# Patient Record
Sex: FEMALE | Race: OTHER | Employment: STUDENT | URBAN - METROPOLITAN AREA
[De-identification: names, ages, dates, MRNs, and addresses within clinical notes are randomized per-mention and may not be internally consistent; named-entity substitution may affect disease eponyms.]

---
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
## 2025-01-03 ENCOUNTER — TELEPHONE (OUTPATIENT)
Dept: FAMILY MEDICINE CLINIC | Facility: CLINIC | Age: 20
End: 2025-01-03

## 2025-01-03 ENCOUNTER — TELEPHONE (OUTPATIENT)
Dept: OBGYN CLINIC | Facility: CLINIC | Age: 20
End: 2025-01-03

## 2025-01-03 DIAGNOSIS — Z34.90 PREGNANCY, UNSPECIFIED GESTATIONAL AGE (HCC): Primary | ICD-10-CM

## 2025-01-03 DIAGNOSIS — N91.2 AMENORRHEA: Primary | ICD-10-CM

## 2025-01-03 NOTE — TELEPHONE ENCOUNTER
Patient notified and verbalized understanding.   Number to [facility] provided     Aware to stop OCP and dicyclomine. Will start prenatal vitamin

## 2025-01-03 NOTE — TELEPHONE ENCOUNTER
Patient calling to initiate prenatal care  LMP ?? PATIENT STATE SHE THINKS ITS BEEN ABOUT 3 MONTHS BUT UNSURE DUE TO COLITIS. OTC TEST TAKEN ON 12/29 WHICH WAS POSITIVE  Patient is 7-8 weeks on ??  Confirmation of pregnancy appointment scheduled on 1/17/25  [facility]    Any history of ectopic pregnancy? NO  Any history of miscarriage? NO  Any medications that you are taking on a regular basis other than prenatal vitamins? NO (if not taking prenatal vitamins, encourage patient to start taking.)  Any bleeding since the first day of last LMP and your positive pregnancy test? NO  Future Appointments   Date Time Provider Department Center   1/17/2025 12:30 PM [facility] [address]   1/17/2025  2:45 PM Goyo Castro MD [facility] [address]   2/19/2025  2:00 PM Lindsey Miller MD [facility] [place]   3/12/2025  8:30 AM [facility]

## 2025-01-03 NOTE — TELEPHONE ENCOUNTER
[employer] activation code sent.  Prenatal information can be sent once [employer] is active.

## 2025-01-03 NOTE — TELEPHONE ENCOUNTER
It may. I still recommend seeing GI as scheduled.     Mean time, get in to see OB (refer to [facility]).   Stop birth control if taking it at all.   Stop dicyclomine.     Start taking prenatal vitamin daily.

## 2025-01-03 NOTE — TELEPHONE ENCOUNTER
PATIENT IS CALLING.  PATIENT SAYS THAT DR FERGUSON HAD DISCUSSED PATIENT HAVING COLITIS.  PATIENT SAYS SHE IS NOW PREGNANT.  ASKING IF THIS WILL AFFECT THE BABY.

## 2025-01-28 ENCOUNTER — OFFICE VISIT (OUTPATIENT)
Dept: FAMILY MEDICINE CLINIC | Facility: CLINIC | Age: 20
End: 2025-01-28
Payer: MEDICAID

## 2025-01-28 VITALS
RESPIRATION RATE: 20 BRPM | SYSTOLIC BLOOD PRESSURE: 102 MMHG | HEART RATE: 87 BPM | WEIGHT: 105.88 LBS | TEMPERATURE: 97 F | DIASTOLIC BLOOD PRESSURE: 70 MMHG | BODY MASS INDEX: 21 KG/M2 | OXYGEN SATURATION: 100 %

## 2025-01-28 DIAGNOSIS — Z23 NEED FOR VACCINATION: ICD-10-CM

## 2025-01-28 DIAGNOSIS — K51.919 ULCERATIVE COLITIS WITH COMPLICATION, UNSPECIFIED LOCATION (HCC): Primary | ICD-10-CM

## 2025-01-28 DIAGNOSIS — Z34.90 PREGNANCY, UNSPECIFIED GESTATIONAL AGE (HCC): ICD-10-CM

## 2025-01-28 PROBLEM — K51.90 ULCERATIVE COLITIS (HCC): Status: ACTIVE | Noted: 2024-12-02

## 2025-01-28 PROCEDURE — 90656 IIV3 VACC NO PRSV 0.5 ML IM: CPT | Performed by: FAMILY MEDICINE

## 2025-01-28 PROCEDURE — 99214 OFFICE O/P EST MOD 30 MIN: CPT | Performed by: FAMILY MEDICINE

## 2025-01-28 PROCEDURE — 90471 IMMUNIZATION ADMIN: CPT | Performed by: FAMILY MEDICINE

## 2025-01-28 RX ORDER — FERROUS SULFATE 325(65) MG
325 TABLET ORAL DAILY
COMMUNITY

## 2025-01-28 RX ORDER — FAMOTIDINE 20 MG/1
20 TABLET, FILM COATED ORAL 2 TIMES DAILY
COMMUNITY

## 2025-01-28 RX ORDER — CHOLECALCIFEROL (VITAMIN D3) 25 MCG
1 TABLET,CHEWABLE ORAL DAILY
COMMUNITY

## 2025-01-28 RX ORDER — PREDNISONE 10 MG/1
10 TABLET ORAL DAILY
COMMUNITY

## 2025-01-28 NOTE — PROGRESS NOTES
Leah Craig is a 19 year old female.  Chief Complaint   Patient presents with    Follow - Up     Hospital follow up for 1 week        HPI:   Pregnant 10 w 4 d. Not too sick recently, but was a lot worse when in the hospital. She and her boyfriend are excited about the baby.     Has had blood in stool for months, pain got worse. Went to ER at [facility]. Admitted 1/15/25-1/21/25. Has been home a week. Living with grandmother and aunt. Her boyfriend (her child's father) lives there as well.   Had a UTI while in hospital as well. That is better.   BM's are better, solid. Less frequent, down to 4 x day.   Hb was down to 7.2. she is taking iron daily.     Eating more, appetite is coming back. She had lost about 10 lbs while she was sick.     ALLERGIES:  Allergies[1]      Current Outpatient Medications   Medication Sig Dispense Refill    prenatal vitamin with DHA 27-0.8-228 MG Oral Cap Take 1 capsule by mouth daily.      predniSONE 10 MG Oral Tab Take 1 tablet (10 mg total) by mouth daily.      Mesalamine  MG Oral Cap CR Take 1 capsule (250 mg total) by mouth 4 (four) times daily.      famotidine 20 MG Oral Tab Take 1 tablet (20 mg total) by mouth 2 (two) times daily.      dicyclomine 20 MG Oral Tab Take 1 tablet (20 mg total) by mouth 4 (four) times daily before meals and nightly. (Patient not taking: Reported on 1/28/2025) 20 tablet 0    ondansetron 4 MG Oral Tablet Dispersible Take 1 tablet (4 mg total) by mouth every 8 (eight) hours as needed. (Patient not taking: Reported on 1/28/2025) 20 tablet 0    Norgestim-Eth Estrad Triphasic (ORTHO TRI-CYCLEN LO) 0.18/0.215/0.25 MG-25 MCG Oral Tab Take 1 tablet by mouth daily. (Patient not taking: Reported on 1/28/2025) 84 tablet 3      No past medical history on file.   Social History:  Social History     Socioeconomic History    Marital status: Single   Tobacco Use    Smoking status: Never    Smokeless tobacco: Never   Vaping Use    Vaping status: Never Used   Substance  and Sexual Activity    Alcohol use: No    Drug use: Yes     Types: Cannabis     Comment: occ     Social Drivers of Health     Food Insecurity: No Food Insecurity (1/28/2025)    NCSS - Food Insecurity     Worried About Running Out of Food in the Last Year: No     Ran Out of Food in the Last Year: No   Transportation Needs: No Transportation Needs (1/28/2025)    NCSS - Transportation     Lack of Transportation: No   Housing Stability: Not At Risk (1/28/2025)    NCSS - Housing/Utilities     Has Housing: Yes     Worried About Losing Housing: No     Unable to Get Utilities: No        BP Readings from Last 6 Encounters:   01/28/25 102/70   12/03/24 98/62   11/11/24 110/68   12/21/23 102/60   10/12/23 96/54   09/18/23 105/55       Wt Readings from Last 6 Encounters:   01/28/25 105 lb 14.4 oz (48 kg) (10%, Z= -1.30)*   12/03/24 116 lb (52.6 kg) (28%, Z= -0.59)*   11/11/24 119 lb (54 kg) (34%, Z= -0.40)*   12/21/23 117 lb 4 oz (53.2 kg) (35%, Z= -0.39)*   10/12/23 115 lb 4 oz (52.3 kg) (31%, Z= -0.49)*   09/18/23 111 lb 6.4 oz (50.5 kg) (23%, Z= -0.73)*     * Growth percentiles are based on CDC (Girls, 2-20 Years) data.       REVIEW OF SYSTEMS:   GENERAL HEALTH: feels well no complaints  SKIN: denies any unusual skin lesions or rashes  RESPIRATORY: denies shortness of breath   CARDIOVASCULAR: denies chest pain on exertion  GI: denies abdominal pain and denies heartburn  NEURO: denies headaches    EXAM:   /70   Pulse 87   Temp 97.3 °F (36.3 °C) (Temporal)   Resp 20   Wt 105 lb 14.4 oz (48 kg)   LMP  (LMP Unknown)   SpO2 100%   BMI 21.39 kg/m²  Body mass index is 21.39 kg/m².      GENERAL: well developed, well nourished,in no apparent distress  SKIN: no rashes,no suspicious lesions  HEENT: atraumatic, normocephalic,ears and throat are clear  NECK: supple,no adenopathy,   LUNGS: clear to auscultation, no rales or wheezing   CARDIO: RRR without murmur  GI: good BS's, no tenderness or guarding. +pelvic  fullness/gravid uterus.   EXTREMITIES: no cyanosis, clubbing or edema    ASSESSMENT AND PLAN:     Encounter Diagnoses   Name Primary?    Ulcerative colitis with complication, unspecified location (HCC) Yes    Pregnancy, unspecified gestational age (HCC)     Need for vaccination        Diagnoses and all orders for this visit:    Ulcerative colitis with complication, unspecified location (HCC)    Pregnancy, unspecified gestational age (HCC)    Need for vaccination  -     Fluzone trivalent vaccine, PF 0.5mL, 6mo+ ([id number])    Follow up with GI, she is trying to get in sooner than March.   Follow up with OB as scheduled, in about 2 weeks.     Orders Placed This Encounter   Procedures    Fluzone trivalent vaccine, PF 0.5mL, 6mo+ ([id number])               Meds & Refills for this Visit:  Requested Prescriptions      No prescriptions requested or ordered in this encounter             The patient indicates understanding of these issues and agrees to the plan.               [1] No Known Allergies

## 2025-02-07 ENCOUNTER — TELEPHONE (OUTPATIENT)
Dept: FAMILY MEDICINE CLINIC | Facility: CLINIC | Age: 20
End: 2025-02-07

## 2025-02-07 NOTE — TELEPHONE ENCOUNTER
Patient states she has cough and runny nose  Started 2 days ago  No fever    Patient can come in if needed but would like message sent first.    Routed to Dr Headley to advise

## 2025-02-07 NOTE — TELEPHONE ENCOUNTER
I would just monitor first. I do not think she needs to be seen at this time.   She can take tylenol as needed for pain.   She can do a nasal spray as needed for congestion, but I would avoid other cold and flu medications with pregnancy.

## 2025-02-07 NOTE — TELEPHONE ENCOUNTER
Patient notified and verbalized understanding.   Will call or go to UC if any new or worsening symptoms.

## 2025-02-07 NOTE — TELEPHONE ENCOUNTER
Patient calling to update that she is sick with a cough. Calling to update PCP. Recently diagnosed with UC and early in pregnancy. GONZALO

## 2025-02-11 ENCOUNTER — TELEPHONE (OUTPATIENT)
Dept: FAMILY MEDICINE CLINIC | Facility: CLINIC | Age: 20
End: 2025-02-11

## 2025-02-11 DIAGNOSIS — Z34.90 PREGNANCY, UNSPECIFIED GESTATIONAL AGE (HCC): Primary | ICD-10-CM

## 2025-02-11 RX ORDER — CHOLECALCIFEROL (VITAMIN D3) 25 MCG
1 TABLET,CHEWABLE ORAL DAILY
Qty: 90 CAPSULE | Refills: 3 | Status: SHIPPED | OUTPATIENT
Start: 2025-02-11

## 2025-02-11 NOTE — TELEPHONE ENCOUNTER
PT CALLED AND WOULD LIKE TO SEE IF DR CAN CALL IN PRENATAL VITAMINS    PLEASE ADV    THANK YOU    [facility] [place]  [place]

## 2025-02-17 ENCOUNTER — OFFICE VISIT (OUTPATIENT)
Dept: OBGYN CLINIC | Facility: CLINIC | Age: 20
End: 2025-02-17
Payer: MEDICAID

## 2025-02-17 ENCOUNTER — ULTRASOUND ENCOUNTER (OUTPATIENT)
Dept: OBGYN CLINIC | Facility: CLINIC | Age: 20
End: 2025-02-17
Payer: MEDICAID

## 2025-02-17 VITALS
DIASTOLIC BLOOD PRESSURE: 60 MMHG | SYSTOLIC BLOOD PRESSURE: 104 MMHG | WEIGHT: 116.25 LBS | BODY MASS INDEX: 23.44 KG/M2 | HEIGHT: 59 IN | HEART RATE: 86 BPM

## 2025-02-17 DIAGNOSIS — Z34.01 ENCOUNTER FOR SUPERVISION OF NORMAL FIRST PREGNANCY IN FIRST TRIMESTER (HCC): ICD-10-CM

## 2025-02-17 DIAGNOSIS — K51.011 ULCERATIVE PANCOLITIS WITH RECTAL BLEEDING (HCC): Primary | ICD-10-CM

## 2025-02-17 DIAGNOSIS — N91.2 AMENORRHEA: ICD-10-CM

## 2025-02-17 RX ORDER — PRENATAL VIT/IRON FUM/FOLIC AC 27MG-0.8MG
1 TABLET ORAL DAILY
COMMUNITY
Start: 2025-02-11

## 2025-02-17 RX ORDER — ALBUTEROL SULFATE 90 UG/1
2 INHALANT RESPIRATORY (INHALATION)
COMMUNITY
Start: 2025-02-09

## 2025-02-17 RX ORDER — FLUTICASONE PROPIONATE 50 MCG
1 SPRAY, SUSPENSION (ML) NASAL DAILY
COMMUNITY
Start: 2025-02-09

## 2025-02-17 NOTE — PROGRESS NOTES
[facility]  Obstetrics and Gynecology    Subjective:     Leah Craig is a 19 year old  who presents to establish care for pregnancy. The patient was recommended to return for further evaluation. Denies abdominal/pelvic pain, VB, abnormal discharge. No LMP recorded (lmp unknown). Patient is pregnant.. Pt reports regular menstrual cycles. She was not actively trying to conceive. She is in a relationship with the FOB. Accompanied today by her mom and her aunt.    The patient reports feeling well now, but was hospitalized at [facility] 1/15/25 for abdominal pain and rectal bleeding related to new diagnosis of ulcerative colitis.   She had an ultrasound at that time demonstrated a single viable IUP at 9w0d, consistent with MANDIE 25.    Review of Systems:  General:  denies fevers, chills, fatigue and malaise.   Respiratory:  denies SOB, dyspnea, cough or wheezing  Cardiovascular:  denies chest pain, palpitations, exercise intolerance   GI: denies abdominal pain, diarrhea, constipation  :  denies dysuria, hematuria, increased urinary frequency.  denies abnormal uterine bleeding or vaginal discharge.       Objective:     Vitals:    25 1404   BP: 104/60   Pulse: 86   Weight: 116 lb 4 oz (52.7 kg)   Height: 59\"       Body mass index is 23.48 kg/m².    General: AAOx3, NAD, appears well  Resp: unlabored breathing    Imaging:  Dating US 2025: Today, viable bello IUP with EFW 86g c/w 13w6d (MANDIE 25). .  No free fluid, adnexa appear normal.       Assessment:     Leah Craig is a 19 year old  with viable bello IUP at 13w5d by 9wk US, final MANDIE 25.     Patient Active Problem List   Diagnosis    Psoriasis of scalp    Ulcerative colitis (HCC)         Plan:     - Prenatal course and care discussed with patient - visits, office locations, providers and on-call physicians. Prenatal labs ordered.   - SAB/return precautions provided   - N/V recommendations if relevant  - Pt  provided with education on safety, diet, exercise, caffiene, tobacco, ETOH, sexual activity, ER precautions, diet, exercise, appropriate otc medication use, substance abuse   - Counseled on taking a PNV with folic acid and DHA  - genetic screening testing d/w patient. She was given educational information and will offer screening at next visit. Advised to call company prior to her next visit to review coverage.  - cervical cancer screening: n/a  - co-morbidities   - ulcerative colitis: taking mesalamine and prednisone, continuing care with [facility] thru [facility]. Referral to [facility].    All of the findings and plan were discussed with the patient.  She notes understanding and agrees with the plan of care.    All questions were answered to the best of my ability at this time.    RTC for new OB visit or sooner if needed     Mame Nuñez MD  [facility]  2/17/2025 2:14 PM

## 2025-03-03 ENCOUNTER — TELEPHONE (OUTPATIENT)
Dept: FAMILY MEDICINE CLINIC | Facility: CLINIC | Age: 20
End: 2025-03-03

## 2025-03-03 NOTE — TELEPHONE ENCOUNTER
Pt states that in 2 weeks she is seeing gastroenterologist.  Current prescriber of meds states that they can not refill because she hasn't seen him in a while and hasn't seen a gastro.  Pt states she hasn't been able to have medicine for 3 days now.  Pt also states that she has been bleeding a little from rectal again.  Pt would like the following filled:  Please advise.  Thank you!    Mesalamine  MG Oral Cap CR [[id number]] (Order [id number])     [id number] - [place], [place] - [address] [place] [address], [phone], [phone]   [address] [place] [id number]   Phone: [phone] Fax: [phone]

## 2025-03-03 NOTE — TELEPHONE ENCOUNTER
Called and spoke with patient who states she is scheduled with GI in 2 weeks but has never seen them before. OB is aware she is on medication and ok with her taking.  Asking if Dr Headley can give refill until she sees GI. Requesting rectal suppositories     Patient also states she is having rectal bleeding with BM and abd pain.  States she was told by the hospital she should return to ER if those symptoms occur again.    Advised patient if she is having abdominal pain and rectal bleeding she needs ER evaluation.   Patient verbalized understanding.    She will go to ER

## 2025-03-04 ENCOUNTER — INITIAL PRENATAL (OUTPATIENT)
Dept: OBGYN CLINIC | Facility: CLINIC | Age: 20
End: 2025-03-04
Payer: MEDICAID

## 2025-03-04 VITALS
HEIGHT: 59 IN | SYSTOLIC BLOOD PRESSURE: 100 MMHG | BODY MASS INDEX: 22.5 KG/M2 | HEART RATE: 91 BPM | WEIGHT: 111.63 LBS | DIASTOLIC BLOOD PRESSURE: 60 MMHG

## 2025-03-04 DIAGNOSIS — Z13.228 SCREENING FOR CYSTIC FIBROSIS: ICD-10-CM

## 2025-03-04 DIAGNOSIS — Z36.8A ENCOUNTER FOR ANTENATAL SCREENING FOR OTHER GENETIC DEFECT (HCC): ICD-10-CM

## 2025-03-04 DIAGNOSIS — K51.919 ULCERATIVE COLITIS WITH COMPLICATION, UNSPECIFIED LOCATION (HCC): ICD-10-CM

## 2025-03-04 DIAGNOSIS — Z34.02 ENCOUNTER FOR SUPERVISION OF NORMAL FIRST PREGNANCY IN SECOND TRIMESTER (HCC): Primary | ICD-10-CM

## 2025-03-04 PROCEDURE — 87591 N.GONORRHOEAE DNA AMP PROB: CPT | Performed by: STUDENT IN AN ORGANIZED HEALTH CARE EDUCATION/TRAINING PROGRAM

## 2025-03-04 PROCEDURE — 87086 URINE CULTURE/COLONY COUNT: CPT | Performed by: STUDENT IN AN ORGANIZED HEALTH CARE EDUCATION/TRAINING PROGRAM

## 2025-03-04 PROCEDURE — 87491 CHLMYD TRACH DNA AMP PROBE: CPT | Performed by: STUDENT IN AN ORGANIZED HEALTH CARE EDUCATION/TRAINING PROGRAM

## 2025-03-04 PROCEDURE — 0500F INITIAL PRENATAL CARE VISIT: CPT | Performed by: STUDENT IN AN ORGANIZED HEALTH CARE EDUCATION/TRAINING PROGRAM

## 2025-03-04 RX ORDER — MESALAMINE 4 G/60ML
SUSPENSION RECTAL
COMMUNITY
Start: 2025-01-31

## 2025-03-04 NOTE — PROGRESS NOTES
[facility]  Obstetrics and Gynecology  History & Physical    CC: Patient is here to establish prenatal care     Subjective:     HPI:  Leah Craig is a 19 year old  female at 15w6d who presents today to establish prenatal care. Patient reports doing well, no complaints. Denies vaginal bleeding, abdominal/pelvic pain, nausea and vomiting.     LMP: No LMP recorded (lmp unknown). Patient is pregnant.  MANDIE:  Estimated Date of Delivery: 25    OB:  OB History    Para Term  AB Living   1             SAB IAB Ectopic Multiple Live Births                  # Outcome Date GA Lbr Marty/2nd Weight Sex Type Anes PTL Lv   1 Current                  GYN:   Menarche: 14  Period Cycle (Days): pregnant  Period Duration (Days): n/a  Period Flow: n/a  Use of Birth Control (if yes, specify type): None  Pap Result Notes: pt never had pap    STD hx- denies.    Pap hx- has never had pap smear    PMH:   Past Medical History:    Ulcerative colitis (HCC)       PSH:  History reviewed. No pertinent surgical history.    MEDS:  Medications Ordered Prior to Encounter[1]    Allergies:    Allergies[2]  N/a latex.     Immunizations:  Immunization History   Administered Date(s) Administered    DTAP 2005, 2006, 2006, 2006, 2006, 2006, 2009    DTAP-IPV 2009    DTAP/HEP B/IPV Combined 2005, 2006    FLULAVAL 6 months & older 0.5 ml Prefilled syringe (91217) 10/05/2017, 2021    FLUZONE 6 months and older PFS 0.5 ml (54657) 10/25/2014, 10/05/2017, 2021    HEP A 2006, 2008    HEP A,Ped/Adol,(2 Dose) 2006, 2008    HEP B 2005, 2005, 2006, 2006    HEP B, Ped/Adol 2005, 2006    HIB 2005, 2006, 2006, 2011    HIB 3 Dose Schedule 2011    Hib, Unspecified Formulation 2005, 2006, 2006    Hpv Virus Vaccine 9 Aydee Im 2021, 10/12/2023    IPV 2005,  02/09/2006, 04/17/2006, 12/04/2009    Influenza 12/29/2006, 01/30/2007, 01/31/2008, 11/20/2008, 12/04/2009    Influenza Vaccine, trivalent (IIV3), PF 0.5mL (93913) 01/28/2025    MMR 09/28/2006, 12/04/2009    Meningococcal Vaccine 10/05/2017    Meningococcal-Menactra 10/05/2017    Meningococcal-Menveo 10-55 YEARS 10/12/2023    Pneumococcal (Prevnar 13) 11/25/2005, 02/09/2006, 04/17/2006, 12/29/2006, 04/21/2011    Pneumococcal (Prevnar 7) 11/25/2005, 02/09/2006, 04/17/2006, 12/29/2006    TDAP 10/05/2017    Varicella 09/28/2006, 12/04/2009   Pended Date(s) Pended    Hpv Virus Vaccine 9 Aydee Im 01/11/2021       Family Hx:    History reviewed. No pertinent family history.    SocialHx:        Social History     Socioeconomic History    Marital status: Single   Tobacco Use    Smoking status: Never    Smokeless tobacco: Never   Vaping Use    Vaping status: Never Used   Substance and Sexual Activity    Alcohol use: No    Drug use: Yes     Types: Cannabis     Comment: occ     Social Drivers of Health     Food Insecurity: No Food Insecurity (1/28/2025)    NCSS - Food Insecurity     Worried About Running Out of Food in the Last Year: No     Ran Out of Food in the Last Year: No   Transportation Needs: No Transportation Needs (1/28/2025)    NCSS - Transportation     Lack of Transportation: No   Housing Stability: Not At Risk (1/28/2025)    NCSS - Housing/Utilities     Has Housing: Yes     Worried About Losing Housing: No     Unable to Get Utilities: No         Patient feels unsafe or threatened?: no       Health maintenance:  Mammogram (age 40 and q1-2yr):  N/A  Colonoscopy (age 50 and q10yr):  N/A    Review of Systems:  General: no complaints per category. See HPI for additional information.   Breast: no complaints per category. See HPI for additional information.   Respiratory: no complaints per category. See HPI for additional information.   Cardiovascular: no complaints per category. See HPI for additional information.   GI:  no complaints per category. See HPI for additional information.   : no complaints per category. See HPI for additional information.   Heme: no complaints per category. See HPI for additional information.     Objective:     PE:  Vitals: /60   Pulse 91   Ht 59\"   Wt 111 lb 9.6 oz (50.6 kg)   LMP  (LMP Unknown)   BMI 22.54 kg/m²   Gen: A/O, NAD  Head/Neck: neck supple, thyroid non-enlarged, symmetric and without nodules  Breasts: breasts symmetric, no dominant or suspicious mass, no skin or nipple changes and no axillary adenopathy  CV: normal peripheral perfusion   Lungs: no tachypnea, non-labored breathing   Abdominal exam: soft, nontender, nondistended  Ext: non-tender, no edema  :  1) External Genitalia -  Normal appearing skin and hair distribution, no visible lesions.   2) Vagina:  mucosa appears pink, and well rugated.   3) Cervix:  smooth, with no visible lesions, erosions, or scars, os closed. No bleeding noted.   4) Uterus: Anteverted. Mobile. 15 week gestational size.   5) Adnexa/parametria:  Non tender adnexa. No masses.     Fetal Well Being Assessment:     BPM    Assessment/Plan:     Leah Craig is a 19 year old  female at 15w6d who presents today to establish prenatal care.    Patient Active Problem List    Diagnosis    Encounter for supervision of normal first pregnancy in second trimester (HCC)     [ ] initial OB panel  [ ] genetic testing  [ ] Level II scan      Ulcerative colitis (HCC)     taking mesalamine and prednisone, continuing care with [facility].   [ ] Referral to [facility]      Psoriasis of scalp         Prenatal care  - prenatal labs ordered  - Pap: not due until patient is 21 years old   - Cervical cultures: GC, Chl collected and ordered via urine sample  - continue PNV daily given        Genetic Screening tests  - Discussion held with patient about genetic screening. Discussion including first trimester versus second trimester screening. Patient offered  Amniocentesis and declined. Pt considering first or second trimester screening. Advised to call the office if she would like to pursue first trimester screening as recommended to complete at 13wks GA  - Cystic fibrosis/SMA/hemoglobinopathy carrier screening: offered and patient will consider, advised to notify office if she desires order        Patient education  - Pt counseled on safety, diet, exercise, caffiene, tobacco, ETOH, sexual activity, ER precautions, diet, exercise, appropriate weight gain, travel, appropriate otc medication use, substance abuse and pets.  - Counseled on taking a PNV with 0.4mg of folic acid   - Limiting intake of alcohol, coffee, tea, soda, and other foods containing caffeine  - Limit intake of fish to twice weekly to limit mercury exposure  - COVID 19 precautions provided    - Pregnancy BMI guidelines: Prepregnancy weight: 105 lbs. Weight today 111 lbs. BMI 21. Expected Weigh gain 25-35 lbs. TWG 6 lbs.    <18.5 = underweight = 28-40 lbs gain = 1 lb/wk (2nd/3rd tri)   18.5 - 24.9 = normal = 25 - 35 lbs gain = 1 lb/wk   25 - 29.9 = overweight = 15 - 25 lbs gain = 0.6 lb/wk   > 30 = obese = 11 - 20 lbs gain = 0.5 lb/wk       RTC in 4 wks     Nisha Rollins DO   [facility] - OBGYN    Discussed with patient that there will not be further notification of normal or benign results other than receiving results on mychart. A Next Performance[employer] message or telephone call will be placed by the physician and/or office staff if results are abnormal.     Note to patient and family   The 21st Century Cures Act makes medical notes available to patients in the interest of transparency.  However, please be advised that this is a medical document.  It is intended as [id number] communication.  It is written and medical language may contain abbreviations or verbiage that are technical and unfamiliar.  It may appear blunt or direct.  Medical documents are intended to carry relevant information, facts as evident, and the  clinical opinion of the practitioner.       [1]   Current Outpatient Medications on File Prior to Visit   Medication Sig Dispense Refill    Mesalamine 4 g Rectal Enema INSERT 60 ML RECTALLY AT NIGHT      Prenatal 27-0.8 MG Oral Tab Take 1 tablet by mouth daily.      albuterol 108 (90 Base) MCG/ACT Inhalation Aero Soln Inhale 2 puffs into the lungs.      prenatal vitamin with DHA 27-0.8-228 MG Oral Cap Take 1 capsule by mouth daily. 90 capsule 3    predniSONE 10 MG Oral Tab Take 1 tablet (10 mg total) by mouth daily.      Mesalamine  MG Oral Cap CR Take 1 capsule (250 mg total) by mouth 4 (four) times daily.      famotidine 20 MG Oral Tab Take 1 tablet (20 mg total) by mouth 2 (two) times daily.      Ferrous Sulfate 325 (65 Fe) MG Oral Tab Take 1 tablet (325 mg total) by mouth daily.      fluticasone propionate 50 MCG/ACT Nasal Suspension 1 spray by Nasal route daily. (Patient not taking: Reported on 3/4/2025)       No current facility-administered medications on file prior to visit.   [2] No Known Allergies

## 2025-03-04 NOTE — PATIENT INSTRUCTIONS
[facility]- Prenatal Testing    The following information is designed to help you understand some of the optional tests that are available to you to screen for problems in your pregnancy.  Before considering any of these tests, you will need to consider the following questions:    [1] What would you do if an abnormality were detected?  If the answer is nothing, then you may decide to decline all testing.  [2] Would you be willing to undergo testing which might increase your risk of miscarriage, such as an amniocentesis or CVS (see below)?  [3] If you have the initial testing, and it indicates that there might be a problem, and you subsequently decide not to do invasive testing such as an amniocentesis, would you worry excessively through the remainder of the pregnancy?    The following tests are available to screen for problems in your pregnancy:      [1]  First Trimester Screening (also called Nuchal Thickness, Nuchal Translucency or NT)- This is an abdominal ultrasound done between 10 and 14 weeks by a perinatology specialist (Maternal Fetal Medicine, MFM) which measures the thickness of the skin behind your baby's neck.  Increased thickness of the skin in this area has been linked to an increased risk of genetic abnormalities like Down Syndrome.  A second visit may be required if the baby is not in the correct position.  The ultrasound results are combined with a finger stick blood test to increase the sensitivity of the test.  You will need to schedule an appointment with the Maternal Fetal Medicine office.  Address and phone number below:  Please verify insurance coverage:  CPT code: 55551 (bello) or 64620 (twins)  Diagnosis: Genetic Screening- Z36.8A  Maternal Fetal Medicine  Dr. Calzada, Dr. López, Dr. Paredes, and Dr. Mobley  [address] [facility] [address] [place] [id number]  Phone [phone]  Fax [phone]    [2] Cell-Free DNA testing (NIPT)- This is a blood test done any time after 10-11  weeks which screens for genetic abnormalities like Down Syndrome.  It is greater than 98% sensitive but requires an amniocentesis for confirmation if abnormal.  It can also tell you the sex of the baby.  CPT code: 40796  Diagnosis code: Genetic screening- Z36.8A  Testing options:   Angela- preferred for [facility] patients or all patients.  Please call [phone] or go to [email or web address] to review billing, prior authorizations or referrals  [id number]- Optional for all insurance plans except [facility].  Please call [employer] at [phone] or go to [email or web address] to review billing, prior authorizations, or referrals      [3]  Quad Screen (also called AFP-plus or Tetra Screen)-  This is a simple blood test which screens for both genetic abnormalities like Down Syndrome and neural tube defects (NTDs), such as spina bifida (an abnormal opening in the spine which can cause paralysis).  It is usually performed around 16-20 weeks.  If the Quad screen comes back abnormal, it does not mean that your baby has an abnormality.  It only means that there is an increased risk of an abnormality.  Additional testing such as a Level II Ultrasound or Amniocentesis may be recommended (see below).  This test is less accurate at picking up genetic abnormalities than the cell-free DNA test.  If you have test #1 or 2, then usually only the AFP part of the Quad screen would be performed to screen for NTD's (see below).  Please verify insurance coverage:  CPT code: 56911  Diagnosis code: Genetic screening- Z36.8A    [4]  Alpha Fetoprotein (AFP, MSAFP)- This is a simple blood test that screens for neural tube defects such as spina bifida (an abnormal opening in the spine).  It is usually performed around 16-20 weeks.  Additional testing such as a Level II ultrasound may be recommended for an abnormal test result.  Please verify insurance coverage:  CPT code: 59160 Diagnosis code: Genetic Screening-  Z36.8A    ---------------------------------------------------------------------------------------------------------------    Carrier screening:     [5] Cystic Fibrosis/SMA Carrier Screening- Cystic Fibrosis is a genetic disease which causes lung problems in the infant.  SMA (spinal muscular atrophy) is a genetic disease that affects the nerve cells of the spinal cord.  These genetic defects would need to be passed from both parents to the child.  A blood test is performed on the mother, and if her test is positive, then the father should also be tested. These tests can be done at any time in the pregnancy, usually in the first trimester with your initial OB labs.  All babies are screened for cystic fibrosis after birth.  Please verify insurance coverage:  Cystic Fibrosis CPT Code: 09464 Diagnosis code: Cystic Fibrosis screening- Z13.228  SMA CPT Code: 79383 Diagnosis code: Genetic Screening- Z36.8A or Testing for Genetic Disease Carrier- Z13.71    [6] Hemoglobinopathy carrier screening: The hemoglobinopathies are heterogeneous genetic disorders of hemoglobin (Hb) typically inherited in an autosomal recessive pattern. The clinical presentation ranges from asymptomatic in carriers to mild to severe disease in homozygotes and compound heterozygotes. At the severe end of the spectrum, hemoglobinopathies impact quality of life, require life-long care (typically with regular blood transfusions), and can shorten life expectancy. In the [place], the American College of Obstetricians and Gynecologists (ACOG) recommends carrier screening and counseling for genetic conditions, ideally before pregnancy. Firstline for hemoglobinopathy carrier screening, includes a CBC evaluating red cell indices in all pregnant individuals [17]. A hemoglobin electrophoresis (or other protein chemistry method) is performed in addition to a CBC if there is suspicion of hemoglobinopathy based on ethnicity (, Mediterranean, Middle  Eastern, Southeast , or [place] citizen descent) or if red cell indices show a low mean MCV or MCH. Characteristics other than ethnicity that are associated with a higher risk for an individual to be a hemoglobinopathy carrier include a history of chronic anemia or stillbirth, a relative with a hemoglobin structural variant or thalassemia, and consanguinity In 2022 ACOG updated its recommendations to offer universal hemoglobinopathy testing to people planning pregnancy or at the initial prenatal visit if no prior testing results are available for interpretation. This is based on a high frequency of a hemoglobinopathy trait, in particular S trait, in the [place] and noting that race and self-identified ethnicity are poor alternatives for genetics.  Hemoglobin electrophoresis: Send out lab to "[employer].  CPT Code: 47858 or 45698 or 08090 Diagnosis code: Z13.0 Encounter screening for hematological disorder    [facility] Carrier Screening: A carrier screening panel is available that includes cystic fibrosis, spinal muscular atrophy, hemoglobinopathy and additional autosomal recessive or X-linked disorders.  A full review of the carrier screening panel was available via [employer] website. Please call [phone] or go to [email or web address] to review billing, prior authorizations or referrals.       ---------------------------------------------------------------------------------------------------------------    [7]  Level II Ultrasound-  This is an abdominal ultrasound done at approximately 20-21 weeks by a perinatology specialist (Z[facility]) at [facility] which looks at many of the baby's internal structures.  Abnormalities in certain structures have been associated with an increased risk of genetic abnormalities.  It also screens for NTD's.  This ultrasound would replace the Level I Ultrasound that we normally perform at our office around the same time.    [8]  Amniocentesis-  During this procedure, a needle is  passed through your abdominal wall to withdrawal some amniotic fluid from around the baby.  It screens for both genetic abnormalities and NTD's and is usually performed around 15-16 weeks.  This test has the highest accuracy for detecting genetic abnormalities, but there may be a small risk of miscarriage or other complications.  A similar procedure called Chorionic Villus Sampling (CVS) is performed by passing a catheter through the vagina to remove a small sample of tissue from the placenta (afterbirth).  CVS may have a higher risk of miscarriage and other rare complications compared to amniocentesis but can be performed earlier in pregnancy.  Amniocentesis is often recommended/offered if any of the previously mentioned tests come back abnormal.  A pamphlet is available if you would like more information about this option.  If you decide to have an amniocentesis, the other tests would be unnecessary.      The above information covers some of the basics.  Pamphlets on the Cell-Free DNA test, Quad Screen and Cystic Fibrosis test should be in your prenatal packet.  Your doctor will discuss this information in more detail, and feel free to ask additional questions.  Also, remember that all of these tests are optional, and will only be performed at your request.  Testing that needs to be done through the perinatologist's office may require 2-3 weeks lead time to schedule.     Foods to Avoid During Pregnancy  Deli Meats- You may eat deli meats from the deli.  If you eat deli meats in the package, it may be contaminated with Listeria. Heat all deli meats in a package to 165 degrees Fahrenheit before eating, even if the label states the meat is “pre-cooked”.    Soft Cheeses and Unpasteurized Products - You may eat soft cheeses that are pasteurized.  Please avoid all soft cheeses, milk or juice that is unpasteurized.  Fish- avoid fish that contains a high level of mercury. These include but are not limited to; Demetria  Orange Roughy, Tile Fish, Shark, Swordfish, and Jackson Mackerel. Please see chart below for good fish choices in pregnancy. Also avoid any raw, uncooked shellfish such as oysters, clams, or sushi.    Make sure to cook all meats; including ground beef, pork, and poultry to their desired temperatures before consuming. This reduces the chance of a food borne illness.  Caffeine- limit to 200 mg or an 8oz cup daily or less.   Alcohol- no amount of alcohol is determined to be safe in pregnancy. Do not drink any alcoholic beverages while pregnant.  Medications Safe in Pregnancy  The following over-the-counter medications may be taken safely after 12 weeks gestation by any patient who is pregnant.  Please follow the instructions on the package for adult dosage.  If you experience any symptoms prior to 12 weeks, please call the office at [phone].      Colds/Congestion: Flonase, Saline Nasal Spray, Neti Pot, Plain Robitussin, Robitussin DM, Mucinex DM, Vicks 44 E, Vicks Vapor rub, Cough drops without Zinc or Sudafed.   Contact your doctor if you have a persistent fever over 100.4, shortness of breath, coughing up green mucus, or a sore throat that persists from more than 3 days    Diarrhea: Imodium, Maalox Anti-Diarrheal or Kaopectate  Contact the office if you have diarrhea for more than 3 days.    Allergies: Benadryl, Claritin or Zyrtec    Hemorrhoids: Tucks pads, Preparation H, Annusol, Sitz bath or Witch hazel  Eat a high fiber diet and drink plenty of fluids.    Yeast: Monistat 3 or 7  Call if your symptoms do not improve, or if you experience vaginal bleeding, or a watery discharge.    Constipation: Metamucil, Colace, fiber supplement, Milk of Magnesia or Dulcolax  Drink plenty of fluids, eat high-fiber foods and take the above laxatives sporadically.     Headache or Mild aches and pain: Extra Strength Tylenol     Gas: Gas X, Gelusil, Papaya Tablets, Maalox, Mylicon or Mylanta Gas    Heartburn: Tums, Mylanta, Pepcid  AC or Maalox    Sore throat: Alcohol free Chloraseptic spray or Lozenges     Nausea and Vomiting: ½ tablet Unisom plus 100mg of Vitamin B6 at bedtime (may increase B6 up to 200mg per day), Josephine root tea or raspberry leaf tea    Insomnia: Tylenol PM, Vitamin B6 50mg, warm bath or milk, Unisom, Nytol or Sominex.     We have listed a few medications for common symptoms seen in pregnancy.  Please contact the office if you are unsure about any over the counter medications that are not listed above.

## 2025-03-05 LAB
C TRACH DNA SPEC QL NAA+PROBE: NEGATIVE
N GONORRHOEA DNA SPEC QL NAA+PROBE: NEGATIVE

## 2025-03-21 ENCOUNTER — TELEPHONE (OUTPATIENT)
Dept: OBGYN CLINIC | Facility: CLINIC | Age: 20
End: 2025-03-21

## 2025-03-21 NOTE — TELEPHONE ENCOUNTER
Pt wants to talk with nurse regarding her wait loss and blood in stool and nausea symptoms due to colitis, She has lost lot of wait from past week. She says she is currently 107 3 lbs down from past week (110).     She wants to know if they can do quick exam to know if baby is doing ok.

## 2025-03-21 NOTE — TELEPHONE ENCOUNTER
18w2d  3/4/2025-  visit    Spoke with Jaida Valdez, patient's mother (verbal release verified)  Patient was being seen in doctor's office and sent to ED due to back pain.  Patient had also noticed decreased fetal movement.  Currently patient and mother are roomed and awaiting treatment.  Instructed to call office once discharged to provide update and establish follow up visit.

## 2025-04-02 ENCOUNTER — ULTRASOUND ENCOUNTER (OUTPATIENT)
Dept: PERINATAL CARE | Facility: HOSPITAL | Age: 20
End: 2025-04-02
Attending: OBSTETRICS & GYNECOLOGY
Payer: MEDICAID

## 2025-04-02 ENCOUNTER — OFFICE VISIT (OUTPATIENT)
Dept: PERINATAL CARE | Facility: HOSPITAL | Age: 20
End: 2025-04-02
Attending: STUDENT IN AN ORGANIZED HEALTH CARE EDUCATION/TRAINING PROGRAM
Payer: MEDICAID

## 2025-04-02 VITALS
HEART RATE: 80 BPM | WEIGHT: 107 LBS | DIASTOLIC BLOOD PRESSURE: 67 MMHG | BODY MASS INDEX: 21.57 KG/M2 | SYSTOLIC BLOOD PRESSURE: 106 MMHG | HEIGHT: 59 IN

## 2025-04-02 DIAGNOSIS — K51.911 ULCERATIVE COLITIS WITH RECTAL BLEEDING, UNSPECIFIED LOCATION (HCC): ICD-10-CM

## 2025-04-02 DIAGNOSIS — O09.892 HIGH RISK TEEN PREGNANCY IN SECOND TRIMESTER (HCC): ICD-10-CM

## 2025-04-02 DIAGNOSIS — K51.90 ULCERATIVE COLITIS (HCC): ICD-10-CM

## 2025-04-02 DIAGNOSIS — K51.90 ULCERATIVE COLITIS (HCC): Primary | ICD-10-CM

## 2025-04-02 PROCEDURE — 76811 OB US DETAILED SNGL FETUS: CPT | Performed by: OBSTETRICS & GYNECOLOGY

## 2025-04-02 NOTE — PROGRESS NOTES
Outpatient Maternal-Fetal Medicine Consultation    Dear Dr. Rollins    Thank you for requesting ultrasound evaluation and maternal fetal medicine consultation on your patient Leah Craig.  As you are aware she is a 19 year old female  with a bello pregnancy and an Estimated Date of Delivery: 25.  A maternal-fetal medicine consultation was requested secondary to ulcerative colitis.  Her prenatal records and labs were reviewed.    She was diagnosed in late November/2024 with ulcerative colitis.  She was recently discharged from an admission for her ulcerative colitis.  She received her first dose of remicade and is on mesalamine along with  prednisone.  While she feels some better than she did in the hospital, she continues to have frequent bowel movements.  This is a desired pregnancy.  ROS    HISTORY  OB History    Para Term  AB Living   1             SAB IAB Ectopic Multiple Live Births                  # Outcome Date GA Lbr Marty/2nd Weight Sex Type Anes PTL Lv   1 Current                Allergies:  Allergies[1]   Current Meds:  Current Outpatient Medications   Medication Sig Dispense Refill    Mesalamine 4 g Rectal Enema INSERT 60 ML RECTALLY AT NIGHT      albuterol 108 (90 Base) MCG/ACT Inhalation Aero Soln Inhale 2 puffs into the lungs.      prenatal vitamin with DHA 27-0.8-228 MG Oral Cap Take 1 capsule by mouth daily. 90 capsule 3    predniSONE 10 MG Oral Tab Take 1 tablet (10 mg total) by mouth daily.      Mesalamine  MG Oral Cap CR Take 1 capsule (250 mg total) by mouth 4 (four) times daily.      famotidine 20 MG Oral Tab Take 1 tablet (20 mg total) by mouth 2 (two) times daily.      Ferrous Sulfate 325 (65 Fe) MG Oral Tab Take 1 tablet (325 mg total) by mouth daily.      Prenatal 27-0.8 MG Oral Tab Take 1 tablet by mouth daily.      fluticasone propionate 50 MCG/ACT Nasal Suspension 1 spray by Nasal route daily. (Patient not taking: Reported on  3/4/2025)          HISTORY:  Past Medical History:    Ulcerative colitis (HCC)      No past surgical history on file.   No family history on file.   Social History     Socioeconomic History    Marital status: Single   Tobacco Use    Smoking status: Never    Smokeless tobacco: Never   Vaping Use    Vaping status: Never Used   Substance and Sexual Activity    Alcohol use: No    Drug use: Yes     Types: Cannabis     Comment: occ     Social Drivers of Health     Food Insecurity: Low Risk  (3/25/2025)    Received from [facility]    Food Insecurity     Have there been times that your food ran out, and you didn't have money to get more?: No     Are there times that you worry that this might happen?: No   Transportation Needs: Low Risk  (3/25/2025)    Received from [facility]    Transportation Needs     Do you have trouble getting transportation to medical appointments?: No   Housing Stability: Low Risk  (3/25/2025)    Received from [facility]    Housing Stability     Are you worried that your electric, gas, oil, or water might be shut off?: No     Are you concerned about having a safe and reliable place to live?: No          PHYSICAL EXAMINATION:  /67 (BP Location: Right arm, Patient Position: Sitting, Cuff Size: adult)   Pulse 80   Ht 4' 11\" (1.499 m)   Wt 107 lb (48.5 kg)   LMP  (LMP Unknown)   BMI 21.61 kg/m²   Physical Exam  Constitutional:       Appearance: Normal appearance.   Abdominal:      Palpations: Abdomen is soft.      Tenderness: There is no abdominal tenderness.   Neurological:      Mental Status: She is alert.   Psychiatric:         Mood and Affect: Mood normal.         Behavior: Behavior normal.           OBSTETRIC ULTRASOUND  The patient had a level II ultrasound today which revealed size consistent with dates and a normal detailed anatomic survey.  Ultrasound Findings:  Single IUP in cephalic presenta??on.  Placenta is  posterior.  A 3 vessel cord is noted.  Cardiac ac??vity is present at 151 bpm   g ( 0 lb 11 oz);  MVP is 4.2 cm .  The fetal measurements are consistent with the established EDC. No ultrasound evidence of structural  abnormali??es are seen today. The nasal bone is present. No ultrasound evidence of markers for aneuploidy are  seen. She understands that ultrasound exam cannot exclude gene??c abnormali??es and that gene??c johnna??ng is  recommended. The limita??ons of ultrasound were discussed.  Uterus and adnexa appeared normal today on US  See PACS/Imaging Tab For Complete Ultrasound Report  I interpreted the results and reviewed them with the patient.    DISCUSSION  During her visit we discussed and reviewed the following issues:  INFLAMMATORY BOWEL DISEASE IN PREGNANCY     INTRODUCTION  Women with inflammatory bowel disease (IBD) are at an increased risk for worse obstetric and medical outcomes as compared with the age-matched general population.     EFFECT OF PREGNANCY ON IBD  For patients with quiescent IBD at conception, the course of IBD is approximately the same as in nonpregnant patients. Patients with Crohn disease have a similar disease course during pregnancy and the postpartum as nonpregnant women with IBD. However, for patients with ulcerative colitis, there is greater disease activity during pregnancy and the postpartum than the nonpregnant patient. The reason for this is unclear and may be associated with smoking cessation in the Crohn patient and cytokines secreted by the placenta.      Approximately one-third of women with quiescent disease at conception relapse during the pregnancy. Relapses are more common during the first trimester. However, remission achieved during pregnancy is likely to be sustained throughout the remainder of the pregnancy.     In contrast, approximately 70 percent of patients with active disease at conception are likely to have continued or worsening symptoms during  pregnancy.     EFFECT OF IBD ON PREGNANCY   Pregnant women with IBD may be at increased risk for antepartum hemorrhage, low birth weight infants, and premature delivery. However, the risk of congenital abnormalities does not appear to be increased.     The degree of disease activity may account, in part, for the poor pregnancy-related outcomes.  The risk of poor pregnancy outcomes in women with IBD is greatest in those who have active disease at the time of conception, and in whom remission may be difficult to achieve during pregnancy.     MEDICATIONS DURING PREGNANCY AND LACTATION  Introduction  The choice of antiinflammatory and immunosuppressive used during pregnancy and lactation should be based upon their relative safety and indications as well as the risk of relapse of inflammatory bowel disease (IBD) if the medications are discontinued.      Active IBD itself is associated with poor pregnancy outcomes, and therefore the risks associated with discontinuing some medications (eg, azathioprine, anti-tumor necrosis factor [anti-TNF] agents) are higher than the known risks of the medications themselves.     NUTRITION  Consultation with a nutritionist should be considered to offer advice on eating a well-balanced, healthy diet.  Folate supplementation (2 mg daily) should be recommended in patients with IBD on low residue diets, with ileal involvement, and patients on medications that interfere with folic acid metabolism (eg, sulfasalazine).  Iron and B12 levels should therefore be checked in the first trimester and supplementation should be provided as needed     MODE OF DELIVERY  Patients with active perineal disease or rectal involvement with Crohn disease should undergo  delivery in order to avoid perineal trauma from vaginal birth that can trigger or worsen existing perineal disease. The mode of delivery in all other patients with inflammatory bowel disease should be dictated by obstetric necessity. In  patients with an ileoanal anastomosis or J pouch, many advocate planned  delivery as well, though data suggest no long-term worsening of pouch function.\Vaginal delivery can be attempted in women with a colostomy, ileostomy, or continent ileostomy and has not been associated with an increased risk of ostomy complications.    Mesalamine - Adverse events have not been observed in animal reproduction studies. Mesalamine is known to cross the placenta. An increased rate of congenital malformations has not been observed in human studies.  birth, still birth and decreased birth weight have been observed; however, these events may also be due to maternal disease.    When treatment for inflammatory bowel disease is needed during pregnancy, mesalamine may be used, although products with DBP should be avoided (Tamiko 2012; Dennis 2009).      Prednisone and prednisolone cross the human placenta. Some studies have shown an association between first trimester prednisone use and oral clefts; adverse events in the fetus/ have been noted in case reports following large doses of systemic corticosteroids during pregnancy.  It is compatible with breast feeding.       Imaging:      CT ABDOMEN PELVIS W CONTRAST 2024  IMPRESSION:   1.  Circumferential colonic wall thickening beginning in the distal transverse colon and extending through the mid to distal sigmoid colon. Subcentimeter pericolonic lymph nodes and prominence of the vasa recta. Findings most consistent with nonspecific colitis. Follow-up endoscopy recommended.  2.   Thick walled peripherally enhancing and collapsing left ovarian cyst. Mild free fluid in the dependent pelvis.  3.  See other findings above.       25   PATIENT NAME: Leah Craig   MRN:[id number]     DATE OF ADMISSION: 1/15/2025  DATE OF DISCHARGE: 25  DISCHARGE LOCATION: home   CONDITION AT DISCHARGE: good   DISCHARGING PHYSICIAN: Maninder Calzada DO     TEST RESULTS PENDING  UPON DISCHARGE: None     REASON FOR HOSPITALIZATION:   Colitis [K52.9]  Anemia [D64.9]  GI bleed [K92.2]  Abdominal pain [R10.9]  First trimester pregnancy [Z34.91]     DISCHARGE DIAGNOSIS:  Hematochezia     HOSPITAL COURSE:   Newly diagnosed ulcerative colitis  Acute on chronic blood loss & Fe def anemia  - GI consulted, s/p flex sig w/biopsies 1/16/25 (path confirmed UC)  - Pred 40x7d, 30x7d, 20x7d, 10x7d, 5x7d  - Cont PO mesalamine indefinitely, rectal mesalamine QHS for 1 month  - PO pepcid BID for GI ppx  - PO iron   - PRN tylenol/norco  - F/u with Dr. Fink to arrange future remicade infusion (s/p 1st dose 1/18/25)    Infliximab (Remicade) is an immunosuppressant agent (monoclonal antibody and TNF blocking agent) used to treat a number of conditions including ankylosing spondylitis, crohn's disease, ulcerative colitis, rheumatoid arthritis, psoriasis and psoriatic arthritis.  It is considered a category B medication in human pregnancy.      There have not been animal studies performed.  Infliximab crosses the placenta and can be detected in serum of infant for up to 6 months following in utero exposure.  Therefore, it is recommended to wait >6 months before administering any live vaccine to infants exposed to infliximab in utero.  When biologic agents such as infliximab are needed during pregnancy, it is recommended to hold therapy after 30 weeks gestation.    Healthcare providers are encouraged to enroll women on infliximab during pregnanc in the [employer] Autoimmune Disease Study by contacting the [employer] ([employer]) [phone].      IMPRESSION:  IUP at 20w0d  Newly diagnosed Ulcerative colitis    RECOMMENDATIONS:  Continue care with Dr. Rollins  Monthly growth US in third trimester with BPP at or beyond 32 weeks.  Continue medications for ulcerative colitis and co-management with GI       Thank you for allowing me to participate in the care of your patient.   Please do not hesitate to contact me if additional questions or concerns arise.      Pauline Paredes M.D.    40 minutes spent in review of records, patient consultation, documentation and coordination of care.  The relevant clinical matter(s) are summarized above.     Note to patient and family  The 21st Century Cures Act makes medical notes available to patients in the interest of transparency.  However, please be advised that this is a medical document.  It is intended as [id number] communication.  It is written and medical language may contain abbreviations or verbiage that are technical and unfamiliar.  It may appear blunt or direct.  Medical documents are intended to carry relevant information, facts as evident, and the clinical opinion of the practitioner.         [1] No Known Allergies

## 2025-04-08 ENCOUNTER — ROUTINE PRENATAL (OUTPATIENT)
Dept: OBGYN CLINIC | Facility: CLINIC | Age: 20
End: 2025-04-08
Payer: MEDICAID

## 2025-04-08 VITALS
HEART RATE: 125 BPM | SYSTOLIC BLOOD PRESSURE: 110 MMHG | DIASTOLIC BLOOD PRESSURE: 56 MMHG | WEIGHT: 113 LBS | BODY MASS INDEX: 22.78 KG/M2 | HEIGHT: 59 IN

## 2025-04-08 DIAGNOSIS — Z3A.20 20 WEEKS GESTATION OF PREGNANCY (HCC): Primary | ICD-10-CM

## 2025-04-08 DIAGNOSIS — K51.919 ULCERATIVE COLITIS WITH COMPLICATION, UNSPECIFIED LOCATION (HCC): ICD-10-CM

## 2025-04-08 PROCEDURE — 99213 OFFICE O/P EST LOW 20 MIN: CPT | Performed by: NURSE PRACTITIONER

## 2025-04-08 RX ORDER — NICOTINE POLACRILEX 2 MG
GUM BUCCAL
COMMUNITY

## 2025-04-08 NOTE — PROGRESS NOTES
[facility]  Obstetrics and Gynecology  Routine OB visit Progress Note    Subjective:     Leah Craig is a 19 year old  at 20w6d who presents for routine OB visit.  Patient reports doing well.   Denies contractions, vaginal bleeding or leakage of fluid.   Good fetal movement.    Review of Systems:  General: no complaints per category. See HPI for additional information.   Breast: no complaints per category. See HPI for additional information.   Respiratory: no complaints per category. See HPI for additional information.   Cardiovascular: no complaints per category. See HPI for additional information.   GI: no complaints per category. See HPI for additional information.   : no complaints per category. See HPI for additional information.   Heme: no complaints per category. See HPI for additional information.     History/Other:   Past Medical History:    Ulcerative colitis (HCC)     History reviewed. No pertinent surgical history.  History reviewed. No pertinent family history.    No family status information on file.             Objective:     Vitals:    25 1620   BP: 110/56   Pulse: (!) 125   Weight: 113 lb (51.3 kg)   Height: 59\"         Body mass index is 22.82 kg/m².    General: AAO.NAD.   CVS exam: normal peripheral perfusion  Chest: non-labored breathing, no tachypnea   Abdominal exam: gravid       Labs:  Prenatal Results       Initial Prenatal Labs [facility] ([place] 0-24w)       Test Value Reference Range Date Time    ABO Group        RH Type        Antibody Screen OB        Rubella Titer OB        Hep B Surf Ag OB        RPR Serology        TREP        HIV Combo Result OB        HGB        HCT        MCV        Platelets        Urine Culture  No Growth at 18-24 hrs.   25 1638    HCV                  Additional Prenatal Labs ([place] 0-24w)       Test Value Reference Range Date Time    Chlamydia with Pap  Negative  Negative 25 1620       Negative  Negative 24 1427    GC  with Pap  Negative  Negative 03/04/25 1620       Negative  Negative 12/03/24 1427    Chlamydia        GC         Pap Smear  NEGATIVE FOR INTRAEPITHELIAL LESION OR MALIGNANCY.   11/11/24 1332    HPV        Sickel Cell Solubility HGB                  2nd Trimester Labs ([place] 24-41w)       Test Value Reference Range Date Time    Antibody Screen OB        Serology RPR        HGB        HCT        Glucose 1 hour        Glucose Melody 3 hr Gestational Fasting        1 Hour glucose        2 Hour glucose        3 hour glucose        Ferritin                  3rd Trimester (28-41w)       Test Value Reference Range Date Time    Blood Type        Antibody screen        Group B Strep OB        HGB        HCT        HIV Combo Result OB        Rapid HIV        Ferritin                  First Trimester & Genetic Testing ([place] 0-40w)       Test Value Reference Range Date Time    MaternaT-21 (T13)        MaternaT-21 (T18)        MaternaT-21 T(T21)        VISIBILI T (T21)        VISIBILI T (T18)        QNatal T13        QNatal T18        QNatal T21        Cystic Fibrosis Screen [32]        Cystic Fibrosis Screen [165]        Cystic Fibrosis Screen [165]        Cystic Fibrosis Screen [165]        Cystic Fibrosis Screen [165]        RH d ([place])        CVS        Nuchal Screening        NIPT [T13]        NIPT [T18]        NIPT [T21]        Carrier Screen        First Trimester Screen        Angela - NIPT [T13]        Angela - NIPT [T18]        Angela - NIPT [T21]        Angela - Carrier Screen                  Genetic Screening ([place] 0-45w)       Test Value Reference Range Date Time    AFP Tetra-Patient's HCG        AFP Tetra-Mom for HCG        AFP Tetra-Patient's UE3        AFP Tetra-Mom for UE3        AFP Tetra-Patient's ADRIAN        AFP Tetra-Mom for ADRIAN        AFP Tetra-Patient's AFP        AFP Tetra-Mom for AFP        AFP. Spina Bifida        Quad Screen ([facility])        AFP        SMA                  Legend    ^: Historical                               Assessment:     Leah Craig is a 19 year old  at 20w6d who presents for routine OB visit. Overall doing well.     Patient Active Problem List   Diagnosis    Psoriasis of scalp    Ulcerative colitis (HCC)    Encounter for supervision of normal first pregnancy in second trimester ([facility])         Plan:     Patient Active Problem List    Diagnosis    Encounter for supervision of normal first pregnancy in second trimester (HCC)     [ ] initial OB panel  [ ] genetic testing  [x] Level II scan      Ulcerative colitis (HCC)     Taking Infiximab (remicade), mesalamine and prednisone, continuing care with GI thru [facility].   [x] Referral to [facility]    Monthly growth US in third trimester with BPP at or beyond 32 weeks.  Continue medications for ulcerative colitis and co-management with GI      Psoriasis of scalp     - FHT: P  - PNL: encouraged pt to complete initial prenatal labs ASAP.  - UC: monthly growth US in 3T  - continue routine prenatal care   - labor and rupture of membrane precautions provided    Return to clinic in 4 weeks for ABIGAIL visit         All of the findings and plan were discussed with the patient.  She notes understanding and agrees with the plan of care.  All questions were answered to the best of my ability at this time.    Carmen Castro NP-C  [facility] - OBGYN       Note to patient and family   The 21st Century Cures Act makes medical notes available to patients in the interest of transparency.  However, please be advised that this is a medical document.  It is intended as wodh-mw-miem communication.  It is written and medical language may contain abbreviations or verbiage that are technical and unfamiliar.  It may appear blunt or direct.  Medical documents are intended to carry relevant information, facts as evident, and the clinical opinion of the practitioner.

## 2025-04-30 ENCOUNTER — ROUTINE PRENATAL (OUTPATIENT)
Dept: OBGYN CLINIC | Facility: CLINIC | Age: 20
End: 2025-04-30
Payer: MEDICAID

## 2025-04-30 ENCOUNTER — LAB ENCOUNTER (OUTPATIENT)
Dept: LAB | Age: 20
End: 2025-04-30
Attending: STUDENT IN AN ORGANIZED HEALTH CARE EDUCATION/TRAINING PROGRAM
Payer: MEDICAID

## 2025-04-30 VITALS
SYSTOLIC BLOOD PRESSURE: 104 MMHG | DIASTOLIC BLOOD PRESSURE: 64 MMHG | WEIGHT: 118 LBS | BODY MASS INDEX: 23.79 KG/M2 | HEIGHT: 59 IN

## 2025-04-30 DIAGNOSIS — O99.019 ANTEPARTUM ANEMIA (HCC): ICD-10-CM

## 2025-04-30 DIAGNOSIS — Z3A.24 24 WEEKS GESTATION OF PREGNANCY (HCC): ICD-10-CM

## 2025-04-30 DIAGNOSIS — Z3A.24 24 WEEKS GESTATION OF PREGNANCY (HCC): Primary | ICD-10-CM

## 2025-04-30 LAB — GLUCOSE 1H P GLC SERPL-MCNC: 141 MG/DL (ref 70–130)

## 2025-04-30 PROCEDURE — 36415 COLL VENOUS BLD VENIPUNCTURE: CPT

## 2025-04-30 PROCEDURE — 82950 GLUCOSE TEST: CPT

## 2025-04-30 PROCEDURE — 99213 OFFICE O/P EST LOW 20 MIN: CPT | Performed by: STUDENT IN AN ORGANIZED HEALTH CARE EDUCATION/TRAINING PROGRAM

## 2025-04-30 NOTE — PATIENT INSTRUCTIONS
Patient please contact the cancer center for scheduling at [phone]      VACCINE RECOMMENDATIONS     Pertussis- [place] has significant outbreaks of pertussis (whooping cough) every year.  Therefore, the CDC recommends that all pregnant women receive a Tdap vaccine during pregnancy, regardless of whether you have received one previously.  The vaccine reduces your chances of ivet the illness, and also provides some natural immunity to your baby for possible exposures after birth.  The best time to get the vaccine is between 27 and 36 weeks.  Baby caregivers (grandparents, spouse) should also make sure they are up to date on the vaccine (within the last 10 years).     Influenza- Pregnant women who develop the flu are more prone to serious complications that can affect both mother and baby.  The CDC recommends that all women who will be pregnant during flu season (October to May) receive the flu vaccine (injection only, not nasal spray).  The vaccine can be given during any stage of pregnancy.     Both vaccines are offered in our office, as well as through many pharmacies and primary care offices.

## 2025-04-30 NOTE — PROGRESS NOTES
[facility]  Obstetrics and Gynecology  Routine OB visit Progress Note    Subjective:     Leah Craig is a 19 year old  at 24w2d who presents for routine OB visit.  Patient reports doing well.   Denies contractions, vaginal bleeding or leakage of fluid.   Good fetal movement.    Review of Systems:  General: no complaints per category. See HPI for additional information.   Breast: no complaints per category. See HPI for additional information.   Respiratory: no complaints per category. See HPI for additional information.   Cardiovascular: no complaints per category. See HPI for additional information.   GI: no complaints per category. See HPI for additional information.   : no complaints per category. See HPI for additional information.   Heme: no complaints per category. See HPI for additional information.     History/Other:     Obstetrical History:   OB History    Para Term  AB Living   1        SAB IAB Ectopic Multiple Live Births             # Outcome Date GA Lbr Marty/2nd Weight Sex Type Anes PTL Lv   1 Current                  Gynecological History:     No LMP recorded (lmp unknown). Patient is pregnant.      Medications:  Current Medications[1]     Allergies:  Allergies[2]    Past Medical History:  Past Medical History[3]    Past Surgical History:  Past Surgical History[4]    Immunizations:   Immunization History   Administered Date(s) Administered    DTAP 2005, 2006, 2006, 2006, 2006, 2006, 2009    DTAP-IPV 2009    DTAP/HEP B/IPV Combined 2005, 2006    FLULAVAL 6 months & older 0.5 ml Prefilled syringe ([id number]) 10/05/2017, 2021    FLUZONE 6 months and older PFS 0.5 ml ([id number]) 10/25/2014, 10/05/2017, 2021    HEP A 2006, 2008    HEP A,Ped/Adol,(2 Dose) 2006, 2008    HEP B 2005, 2005, 2006, 2006    HEP B, Ped/Adol 2005, 2006    HIB 2005,  02/09/2006, 04/17/2006, 04/21/2011    HIB 3 Dose Schedule 04/21/2011    Hib, Unspecified Formulation 11/25/2005, 02/09/2006, 04/17/2006    Hpv Virus Vaccine 9 Aydee Im 01/11/2021, 10/12/2023    IPV 11/25/2005, 02/09/2006, 04/17/2006, 12/04/2009    Influenza 12/29/2006, 01/30/2007, 01/31/2008, 11/20/2008, 12/04/2009    Influenza Vaccine, trivalent (IIV3), PF 0.5mL ([id number]) 01/28/2025    MMR 09/28/2006, 12/04/2009    Meningococcal Vaccine 10/05/2017    Meningococcal-Menactra 10/05/2017    Meningococcal-Menveo 10-55 YEARS 10/12/2023    Pneumococcal (Prevnar 13) 11/25/2005, 02/09/2006, 04/17/2006, 12/29/2006, 04/21/2011    Pneumococcal (Prevnar 7) 11/25/2005, 02/09/2006, 04/17/2006, 12/29/2006    TDAP 10/05/2017    Varicella 09/28/2006, 12/04/2009   Pended Date(s) Pended    Hpv Virus Vaccine 9 Aydee Im 01/11/2021           Objective:     Objective:       Vitals:    04/30/25 1405   BP: 104/64   Weight: 118 lb (53.5 kg)   Height: 59\"         Body mass index is 23.83 kg/m².    General: AAO.NAD.   CVS exam: normal peripheral perfusion  Chest: non-labored breathing, no tachypnea   Abdominal exam: gravid   Pelvic exam: deferred      Labs:  Prenatal Results       Initial Prenatal Labs EDWARD (GA 0-24w)       Test Value Reference Range Date Time    ABO Group        RH Type        Antibody Screen OB        Rubella Titer OB        Hep B Surf Ag OB        RPR Serology        TREP        HIV Combo Result OB        HGB        HCT        MCV        Platelets        Urine Culture  No Growth at 18-24 hrs.   03/04/25 1638    HCV                  Additional Prenatal Labs (GA 0-24w)       Test Value Reference Range Date Time    Chlamydia with Pap  Negative  Negative 03/04/25 1620       Negative  Negative 12/03/24 1427    GC with Pap  Negative  Negative 03/04/25 1620       Negative  Negative 12/03/24 1427    Chlamydia        GC         Pap Smear  NEGATIVE FOR INTRAEPITHELIAL LESION OR MALIGNANCY.   11/11/24 1332    HPV        Sickel Cell  Solubility HGB                  2nd Trimester Labs (GA 24-41w)       Test Value Reference Range Date Time    Antibody Screen OB        Serology RPR        HGB        HCT        Glucose 1 hour  141 mg/dL See comment 25 1547    Glucose Melody 3 hr Gestational Fasting        1 Hour glucose        2 Hour glucose        3 hour glucose        Ferritin                  3rd Trimester (28-41w)       Test Value Reference Range Date Time    Blood Type        Antibody screen        Group B Strep OB        HGB        HCT        HIV Combo Result OB        Rapid HIV        Ferritin                  First Trimester & Genetic Testing (GA 0-40w)       Test Value Reference Range Date Time    MaternaT-21 (T13)        MaternaT-21 (T18)        MaternaT-21 T(T21)        VISIBILI T (T21)        VISIBILI T (T18)        QNatal T13        QNatal T18        QNatal T21        Cystic Fibrosis Screen [32]        Cystic Fibrosis Screen [165]        Cystic Fibrosis Screen [165]        Cystic Fibrosis Screen [165]        Cystic Fibrosis Screen [165]        RH d (ReDent Nova)        CVS        Nuchal Screening        NIPT [T13]        NIPT [T18]        NIPT [T21]        Carrier Screen        First Trimester Screen        Angela - NIPT [T13]        Angela - NIPT [T18]        Angela - NIPT [T21]        Angela - Carrier Screen                  Genetic Screening (GA 0-45w)       Test Value Reference Range Date Time    AFP Tetra-Patient's HCG        AFP Tetra-Mom for HCG        AFP Tetra-Patient's UE3        AFP Tetra-Mom for UE3        AFP Tetra-Patient's ADRIAN        AFP Tetra-Mom for ADRIAN        AFP Tetra-Patient's AFP        AFP Tetra-Mom for AFP        AFP. Spina Bifida        Quad Screen ([facility])        AFP        SMA                  Legend    ^: Historical                                Assessment & Plan:     Leah Craig is a 19 year old  at 24w2d who presents for routine OB visit. Overall doing well.     Patient Active Problem List     Diagnosis    Antepartum anemia (HCC)     Referral to hematology       Encounter for supervision of normal first pregnancy in second trimester (HCC)     [ ] initial OB panel  [ ] genetic testing  [x] Level II scan      Ulcerative colitis (HCC)     Taking Infiximab (remicade), mesalamine and prednisone, continuing care with GI thru [facility].   [x] Referral to MFM    Monthly growth US in third trimester with BPP at or beyond 32 weeks.  Continue medications for ulcerative colitis and co-management with GI      Psoriasis of scalp       Doing well.  Denies LOF/VB/uctx. +FM.   RH unknown, reminded to complete prenatal labs  S/P Anatomy scan with MFM  1 hr glucose and CBC (24-28wks) ordered      All of the findings and plan were discussed with the patient.  She notes understanding and agrees with the plan of care.  All questions were answered to the best of my ability at this time.      Total patient time was 15 minutes in evaluation, consultation, and coordination of care. This included face to face and non-face to face actions. The patient's questions and concerns were addressed.       Jose Elkins MD  [facility] - OBGYN        Note to patient and family   The 21st Century Cures Act makes medical notes available to patients in the interest of transparency.  However, please be advised that this is a medical document.  It is intended as oedl-gv-akcu communication.  It is written and medical language may contain abbreviations or verbiage that are technical and unfamiliar.  It may appear blunt or direct.  Medical documents are intended to carry relevant information, facts as evident, and the clinical opinion of the practitioner.      This note could include assistance by Dragon voice recognition. Errors in content may be related to improper recognition by the system; efforts to review and correct have been done but errors may still exist.              [1]   No outpatient medications have been marked as taking for the 4/30/25 encounter  (Routine Prenatal) with Jose Elkins MD.   [2] No Known Allergies  [3]   Past Medical History:   Ulcerative colitis (HCC)   [4] No past surgical history on file.

## 2025-05-02 PROBLEM — O99.019 ANTEPARTUM ANEMIA (HCC): Status: ACTIVE | Noted: 2025-05-02

## 2025-05-06 ENCOUNTER — PATIENT MESSAGE (OUTPATIENT)
Dept: OBGYN CLINIC | Facility: CLINIC | Age: 20
End: 2025-05-06

## 2025-05-15 NOTE — PROGRESS NOTES
[facility] Hematology and Oncology Clinic Note    Visit Diagnosis:  1. Ulcerative colitis with complication, unspecified location (HCC)    2. Antepartum anemia (HCC)        History of Present Illness: 19F referred by Dr. Headley to discuss anemia in pregnancy. She has a history of IBD and is on mesalamine. Previously on remicade. She has a history of heavy periods in the past. Hematochezia controlled now. She takes PO iron. She has been on this for about 3-4 months. Has chronic abdominal pain. Feels some what fatigued.     She is pregnant and due on 8/20/25  Ferritin in 2021 3.3  Labs from 04/2025-[facility] 9.7.     Review of Systems: 12 Point ROS was completed and pertinent positives are in the HPI    Medications Ordered Prior to Encounter[1]  Past Medical History[2]  Past Surgical History[3]  Set as collapsible by default.[4]   Family History[5]    Physical Exam  Height: 149.9 cm (4' 11.02\") (05/16 0922)  Weight: 54.9 kg (121 lb) (05/16 0922)  BSA (Calculated - sq m): 1.49 sq meters (05/16 0922)  Pulse: 85 (05/16 0922)  BP: 105/71 (05/16 0922)  Temp: 97.8 °F (36.6 °C) (05/16 0922)  Do Not Use - Resp Rate: --  SpO2: 97 % (05/16 0922)    General: NAD, AOX3  HEENT: clear op, mmm, no jvd, no scleral icterus  CV: RR  Extremities: No edema   Lungs: no increased work of breathing  Abd: pregnant   Neuro: CN: II-XII grossly intact      Results:  Lab Results   Component Value Date    WBC 5.9 08/19/2022    HGB 11.0 (L) 08/19/2022    HCT 35.4 08/19/2022    MCV 78.0 08/19/2022    .0 08/19/2022       No results for input(s): \"GLU\", \"BUN\", \"CREATSERUM\", \"GFRAA\", \"GFRNAA\", \"EGFRCR\", \"CA\", \"ALB\", \"NA\", \"K\", \"CL\", \"CO2\", \"ALKPHO\", \"AST\", \"ALT\", \"BILT\", \"TP\" in the last 168 hours.    Radiology: reviewed     Assessment and Plan:  History of Iron Deficiency Anemia 2/2 IBD, Pregnancy and Menses   IBD-on mesalamine/remicade with GI  Pregnant-Due 8/20/25    She is likely iron deficient with a history of a ferritin of 3 in 2021, IBD  and increased demand in pregnancy.     -Check CBC, Fe studies, B12, Folate  -If Fe deficient, recommend IV InFED x 1. Risk and benefits discussed   -Repeat CBC/Fe studies in 3 months     MDM: high-IV Iron     DEEJAY Mcnair MD  [facility]         [1]   Current Outpatient Medications on File Prior to Visit   Medication Sig Dispense Refill    mesalamine 1.2 g Oral Tab EC Take 4 tablets (4.8 g total) by mouth daily with breakfast.      Biotin 1 MG Oral Cap Take by mouth.      Mesalamine 4 g Rectal Enema       Prenatal 27-0.8 MG Oral Tab Take 1 tablet by mouth in the morning.      prenatal vitamin with DHA 27-0.8-228 MG Oral Cap Take 1 capsule by mouth daily. 90 capsule 3    predniSONE 10 MG Oral Tab Take 1 tablet (10 mg total) by mouth in the morning.      Mesalamine  MG Oral Cap CR Take 1 capsule (250 mg total) by mouth in the morning and 1 capsule (250 mg total) at noon and 1 capsule (250 mg total) in the evening and 1 capsule (250 mg total) before bedtime.      famotidine 20 MG Oral Tab Take 1 tablet (20 mg total) by mouth in the morning and 1 tablet (20 mg total) before bedtime.      Ferrous Sulfate 325 (65 Fe) MG Oral Tab Take 1 tablet (325 mg total) by mouth in the morning.      fluticasone propionate 50 MCG/ACT Nasal Suspension 1 spray by Nasal route daily. (Patient not taking: Reported on 5/16/2025)      albuterol 108 (90 Base) MCG/ACT Inhalation Aero Soln Inhale 2 puffs into the lungs. (Patient not taking: Reported on 5/16/2025)       No current facility-administered medications on file prior to visit.   [2]   Past Medical History:   Ulcerative colitis (HCC)   [3] No past surgical history on file.  [4]   Social History  Socioeconomic History    Marital status: Single   Tobacco Use    Smoking status: Never    Smokeless tobacco: Never   Vaping Use    Vaping status: Never Used   Substance and Sexual Activity    Alcohol use: No    Drug use: Yes     Types: Cannabis      Comment: occ   [5] No family history on file.

## 2025-05-16 ENCOUNTER — OFFICE VISIT (OUTPATIENT)
Age: 20
End: 2025-05-16
Attending: INTERNAL MEDICINE
Payer: MEDICAID

## 2025-05-16 VITALS
RESPIRATION RATE: 16 BRPM | HEIGHT: 59.02 IN | WEIGHT: 121 LBS | DIASTOLIC BLOOD PRESSURE: 71 MMHG | TEMPERATURE: 98 F | BODY MASS INDEX: 24.39 KG/M2 | HEART RATE: 85 BPM | OXYGEN SATURATION: 97 % | SYSTOLIC BLOOD PRESSURE: 105 MMHG

## 2025-05-16 DIAGNOSIS — K51.919 ULCERATIVE COLITIS WITH COMPLICATION, UNSPECIFIED LOCATION (HCC): Primary | ICD-10-CM

## 2025-05-16 DIAGNOSIS — O99.019 ANTEPARTUM ANEMIA (HCC): ICD-10-CM

## 2025-05-16 RX ORDER — MESALAMINE 1.2 G/1
4.8 TABLET, DELAYED RELEASE ORAL
COMMUNITY
Start: 2025-04-24

## 2025-05-16 NOTE — PROGRESS NOTES
Chief Complaint   Patient presents with    Consult     Pt is here for consult - iron deficiency in pregnancy. History of ulcerative colitis taking prednisone/mesalamine; has tried oral iron. Some bleeding in the stool; prior to pregnancy she reports heavy stools.    Education Record    Learner:  Patient and Family Member    Disease / Diagnosis: VÍCTOR due to pregnancy    Barriers / Limitations:  None   Comments:    Method:  Brief focused   Comments:    General Topics:  Medication, Side effects and symptom management, and Plan of care reviewed   Comments:    Outcome:  Shows understanding   Comments:

## 2025-05-21 ENCOUNTER — OFFICE VISIT (OUTPATIENT)
Age: 20
End: 2025-05-21
Attending: INTERNAL MEDICINE
Payer: MEDICAID

## 2025-05-21 VITALS
WEIGHT: 122.63 LBS | RESPIRATION RATE: 16 BRPM | DIASTOLIC BLOOD PRESSURE: 60 MMHG | BODY MASS INDEX: 24.72 KG/M2 | HEIGHT: 59.02 IN | HEART RATE: 65 BPM | SYSTOLIC BLOOD PRESSURE: 101 MMHG | OXYGEN SATURATION: 94 % | TEMPERATURE: 98 F

## 2025-05-21 DIAGNOSIS — O99.019 ANTEPARTUM ANEMIA (HCC): Primary | ICD-10-CM

## 2025-05-21 DIAGNOSIS — O99.012 MATERNAL IRON DEFICIENCY ANEMIA AFFECTING PREGNANCY IN SECOND TRIMESTER, ANTEPARTUM (HCC): ICD-10-CM

## 2025-05-21 DIAGNOSIS — T80.90XA INFUSION REACTION, INITIAL ENCOUNTER: Primary | ICD-10-CM

## 2025-05-21 DIAGNOSIS — D50.9 MATERNAL IRON DEFICIENCY ANEMIA AFFECTING PREGNANCY IN SECOND TRIMESTER, ANTEPARTUM (HCC): ICD-10-CM

## 2025-05-21 RX ORDER — METHYLPREDNISOLONE SODIUM SUCCINATE 125 MG/2ML
125 INJECTION INTRAMUSCULAR; INTRAVENOUS EVERY 6 HOURS
Status: DISCONTINUED | OUTPATIENT
Start: 2025-05-21 | End: 2025-05-21

## 2025-05-21 RX ADMIN — METHYLPREDNISOLONE SODIUM SUCCINATE 125 MG: 125 INJECTION INTRAMUSCULAR; INTRAVENOUS at 15:13:00

## 2025-05-21 NOTE — PROGRESS NOTES
[facility] Progress Note    Patient Name: Leah Craig   YOB: 2005   Medical Record Number: [id number]   CSN: [id number]   Date of visit: 5/21/2025   Provider: DEJAN Moore  Referring Physician: No ref. provider found    Problem List:  Problem List[1]     NOTE TO PATIENT:  The 21st Century Cures Act makes medical notes like these available to patients in the interest of transparency. Please be advised this is a medical document. Medical documents are intended to carry relevant information, facts as evident, and the clinical opinion of the practitioner. The medical note is intended as peer to peer communication and may appear blunt or direct. It is written in medical language and may contain abbreviations or verbiage that are unfamiliar.     Chief Complaint:  Infusion reaction       History of Present Illness:  19 year old  patient of Dr. Duong  who is 27 weeks pregnant.  Receiving Infed.  Pt developed chest tightness, SOB, flushing, N/V, and abd pain during Infed test dose.  No swelling, no difficulty swallowing, no itching or rashes.  She is diaphoretic.    Allergies:  Allergies[2]    Vital Signs:  LMP  (LMP Unknown)   Height: 149.9 cm (4' 11.02\") (05/21 1448)  Weight: 55.6 kg (122 lb 9.6 oz) (05/21 1448)  BSA (Calculated - sq m): 1.5 sq meters (05/21 1448)  Pulse: 65 (05/21 1511)  BP: 101/60 (05/21 1511)  Temp: 97.9 °F (36.6 °C) (05/21 1448)  Do Not Use - Resp Rate: --  SpO2: 94 % (05/21 1511)    Medications:  Medications - Current[3]    Review of Systems:   As in HPI    Physical Exam  General: Awake, alert, oriented x3, distressed from vomiting.    HEENT:  No periorbital, oropharyngeal, lip/tongue swelling  Neck:  Supple, no swelling  Lungs:  Clear to auscultation bilaterally, no wheeze, no stridor, no crackles, no ronchi  CV:  Regular rate and rhythm  Abdomen:  Non-distended, normoactive bowel sounds, soft,nontender  Extremities:  No edema, no tenderness  Neuro:  Grossly  intact  Skin:  No rash or urticarial lesions      Impression/Plan:    Infusion reaction:  Characterized by flushing, chest tightness, SOB, N/V, and abdominal pain during the Infed test dose.    Solumedrol 125mg  DC Infed  Pt improved  Remained hemodynamically stable  Return for Venofer    Iron deficiency anemia in pregnancy, 27 weeks:  Tolerated Venofer in Jan and Mar 2025  Return for Venofer  Dr. Mcnair aware and concurs.      DEJAN Moore  [facility]  [facility] [place]         [1]   Patient Active Problem List  Diagnosis    Psoriasis of scalp    Ulcerative colitis (HCC)    Encounter for supervision of normal first pregnancy in second trimester (HCC)    Antepartum anemia (HCC)   [2] No Known Allergies  [3]   Current Outpatient Medications:     mesalamine 1.2 g Oral Tab EC, Take 4 tablets (4.8 g total) by mouth daily with breakfast., Disp: , Rfl:     Biotin 1 MG Oral Cap, Take by mouth., Disp: , Rfl:     Mesalamine 4 g Rectal Enema, , Disp: , Rfl:     Prenatal 27-0.8 MG Oral Tab, Take 1 tablet by mouth in the morning., Disp: , Rfl:     fluticasone propionate 50 MCG/ACT Nasal Suspension, 1 spray by Nasal route daily. (Patient not taking: Reported on 5/16/2025), Disp: , Rfl:     albuterol 108 (90 Base) MCG/ACT Inhalation Aero Soln, Inhale 2 puffs into the lungs. (Patient not taking: Reported on 5/16/2025), Disp: , Rfl:     prenatal vitamin with DHA 27-0.8-228 MG Oral Cap, Take 1 capsule by mouth daily., Disp: 90 capsule, Rfl: 3    predniSONE 10 MG Oral Tab, Take 1 tablet (10 mg total) by mouth in the morning., Disp: , Rfl:     Mesalamine  MG Oral Cap CR, Take 1 capsule (250 mg total) by mouth in the morning and 1 capsule (250 mg total) at noon and 1 capsule (250 mg total) in the evening and 1 capsule (250 mg total) before bedtime., Disp: , Rfl:     famotidine 20 MG Oral Tab, Take 1 tablet (20 mg total) by mouth in the morning and 1 tablet (20 mg total)  before bedtime., Disp: , Rfl:     Ferrous Sulfate 325 (65 Fe) MG Oral Tab, Take 1 tablet (325 mg total) by mouth in the morning., Disp: , Rfl:

## 2025-05-21 NOTE — PROGRESS NOTES
Medication involved - Infed    Grade of reaction, patient symptoms at time of reaction - Grade 2; flushing, chest pressure/SOB/nausea/vomiting/abdominal cramping    Vital Signs taken - see flowsheet    MD/STACY/PA called to room, orders received - Mandi Love NP; Solumedrol 125mg IVP    Outcome of reaction - patient not re-challenged, discharged in stable condition    Education Record    Learner:  Patient    Disease / Diagnosis: Antepartum anemia    Barriers / Limitations:  None   Comments:    Method:  Brief focused and Reinforcement   Comments:    General Topics:  Medication, Side effects and symptom management, and Plan of care reviewed   Comments:    Outcome:  Shows understanding   Comments:    Patient here for new Infed. She received 6mL of Infed 25mg test dose when she began feeling chest pressure, SOB, flushing/redness, nausea/vomiting, and abdominal cramping. ALBERTINA Raymond called to treatment room. IVF bolus initiated, vitals taken, O2 placed. Orders given to push Solumedrol 125mg x 1. Patient sx's resolved quickly. She was helped to the bathroom and return to chair for observation. Patient stated she felt better prior to discharge. Patient made aware we will call her to schedule new IV iron once authorized pending MD's recommendations. Patient discharged in stable condition.

## 2025-05-22 ENCOUNTER — TELEPHONE (OUTPATIENT)
Age: 20
End: 2025-05-22

## 2025-05-28 ENCOUNTER — ROUTINE PRENATAL (OUTPATIENT)
Dept: OBGYN CLINIC | Facility: CLINIC | Age: 20
End: 2025-05-28
Payer: MEDICAID

## 2025-05-28 ENCOUNTER — OFFICE VISIT (OUTPATIENT)
Dept: PERINATAL CARE | Facility: HOSPITAL | Age: 20
End: 2025-05-28
Attending: OBSTETRICS & GYNECOLOGY
Payer: MEDICAID

## 2025-05-28 VITALS
BODY MASS INDEX: 24.8 KG/M2 | SYSTOLIC BLOOD PRESSURE: 110 MMHG | WEIGHT: 123 LBS | HEIGHT: 59 IN | HEART RATE: 94 BPM | DIASTOLIC BLOOD PRESSURE: 71 MMHG

## 2025-05-28 VITALS
SYSTOLIC BLOOD PRESSURE: 110 MMHG | BODY MASS INDEX: 25.2 KG/M2 | HEART RATE: 111 BPM | HEIGHT: 59 IN | WEIGHT: 125 LBS | DIASTOLIC BLOOD PRESSURE: 60 MMHG

## 2025-05-28 DIAGNOSIS — O99.019 ANTEPARTUM ANEMIA (HCC): ICD-10-CM

## 2025-05-28 DIAGNOSIS — Z34.00 SUPERVISION OF NORMAL FIRST PREGNANCY, ANTEPARTUM (HCC): Primary | ICD-10-CM

## 2025-05-28 DIAGNOSIS — Z11.3 SCREENING EXAMINATION FOR STD (SEXUALLY TRANSMITTED DISEASE): ICD-10-CM

## 2025-05-28 DIAGNOSIS — N76.0 VAGINITIS AND VULVOVAGINITIS: ICD-10-CM

## 2025-05-28 DIAGNOSIS — Z34.90 ENCOUNTER FOR SUPERVISION OF NORMAL PREGNANCY, ANTEPARTUM, UNSPECIFIED GRAVIDITY (HCC): ICD-10-CM

## 2025-05-28 DIAGNOSIS — K51.919 ULCERATIVE COLITIS WITH COMPLICATION, UNSPECIFIED LOCATION (HCC): ICD-10-CM

## 2025-05-28 DIAGNOSIS — O09.893 HIGH RISK TEEN PREGNANCY IN THIRD TRIMESTER (HCC): ICD-10-CM

## 2025-05-28 DIAGNOSIS — Z23 NEED FOR VACCINATION: ICD-10-CM

## 2025-05-28 DIAGNOSIS — K51.919 ULCERATIVE COLITIS WITH COMPLICATION, UNSPECIFIED LOCATION (HCC): Primary | ICD-10-CM

## 2025-05-28 DIAGNOSIS — K51.90 ULCERATIVE COLITIS (HCC): ICD-10-CM

## 2025-05-28 PROCEDURE — 81514 NFCT DS BV&VAGINITIS DNA ALG: CPT | Performed by: OBSTETRICS & GYNECOLOGY

## 2025-05-28 PROCEDURE — 87491 CHLMYD TRACH DNA AMP PROBE: CPT | Performed by: OBSTETRICS & GYNECOLOGY

## 2025-05-28 PROCEDURE — 87591 N.GONORRHOEAE DNA AMP PROB: CPT | Performed by: OBSTETRICS & GYNECOLOGY

## 2025-05-28 PROCEDURE — 99213 OFFICE O/P EST LOW 20 MIN: CPT | Performed by: OBSTETRICS & GYNECOLOGY

## 2025-05-28 PROCEDURE — 90715 TDAP VACCINE 7 YRS/> IM: CPT | Performed by: OBSTETRICS & GYNECOLOGY

## 2025-05-28 PROCEDURE — 76816 OB US FOLLOW-UP PER FETUS: CPT | Performed by: OBSTETRICS & GYNECOLOGY

## 2025-05-28 PROCEDURE — 90471 IMMUNIZATION ADMIN: CPT | Performed by: OBSTETRICS & GYNECOLOGY

## 2025-05-28 NOTE — PROGRESS NOTES
[facility]  Obstetrics and Gynecology  Routine OB visit Progress Note    Subjective:     Leah Criag is a 19 year old  at 28w0d who presents for routine OB visit.  Patient reports doing well.   Denies contractions, vaginal bleeding or leakage of fluid.  Good fetal movement.  Reports that she feels a burning and itchy sensation in the vaginal area.  Concerned about possible STI.  No lesions noted.  No lesions on her partner noted.      Assessment:     Leah Craig is a 19 year old  at 28w0d who presents for routine OB visit. Overall doing well.     Problem List Items Addressed This Visit          HCC Problems    Ulcerative colitis (HCC)       Gravid and     Supervision of normal first pregnancy, antepartum (HCC) - Primary       Hematology and Neoplasia    Antepartum anemia (HCC)     Other Visit Diagnoses         Encounter for supervision of normal pregnancy, antepartum, unspecified  (HCC)        Relevant Orders    HIV Ag/Ab Combo    T Pallidum Screening [place]      Need for vaccination        Relevant Orders    Immunization Admin Counseling, 1st Component, 18 years and older    TdaP (Boostrix/Adacel) Vaccine (> 7 Y)      Screening examination for STD (sexually transmitted disease)        Relevant Orders    Hepatitis Panel, Acute (4)    Chlamydia/Gc Amplification      Vaginitis and vulvovaginitis        Relevant Orders    Vaginitis Vaginosis PCR Panel                Plan:     Patient Active Problem List    Diagnosis    Antepartum anemia (HCC)     Referral to hematology       Supervision of normal first pregnancy, antepartum (HCC)     [ ] initial OB panel  [ ] genetic testing  [x] Level II scan      Ulcerative colitis (HCC)     Taking Infiximab (remicade), mesalamine and prednisone, continuing care with GI thru [facility].   [x] Referral to [facility]    Monthly growth US in third trimester with BPP at or beyond 32 weeks: normal growth a 28 weeks  Continue medications for  ulcerative colitis and co-management with GI      Psoriasis of scalp       Doing well, +FM  Denies LOF/VB/uctx  Rh +, TDAP today received, EPDS 2  STI screening sent     Return to clinic in 2 weeks for ABIGAIL visit       History/Other:     Review of Systems:  General: no complaints per category. See HPI for additional information.   Breast: no complaints per category. See HPI for additional information.   Respiratory: no complaints per category. See HPI for additional information.   Cardiovascular: no complaints per category. See HPI for additional information.   GI: no complaints per category. See HPI for additional information.   : no complaints per category. See HPI for additional information.   Heme: no complaints per category. See HPI for additional information.     Obstetrical History:   OB History    Para Term  AB Living   1        SAB IAB Ectopic Multiple Live Births             # Outcome Date GA Lbr Marty/2nd Weight Sex Type Anes PTL Lv   1 Current                  Gynecological History:     No LMP recorded (lmp unknown). Patient is pregnant.      Medications:   mesalamine 1.2 g Oral Tab EC Take 4 tablets (4.8 g total) by mouth daily with breakfast.      Biotin 1 MG Oral Cap Take by mouth.      Mesalamine 4 g Rectal Enema       prenatal vitamin with DHA 27-0.8-228 MG Oral Cap Take 1 capsule by mouth daily. 90 capsule 3    predniSONE 10 MG Oral Tab Take 1 tablet (10 mg total) by mouth in the morning.      famotidine 20 MG Oral Tab Take 1 tablet (20 mg total) by mouth in the morning and 1 tablet (20 mg total) before bedtime.          Allergies:  No Known Allergies    Past Medical History:  Past Medical History:    Ulcerative colitis (HCC)       Past Surgical History:  History reviewed. No pertinent surgical history.    Immunizations:   Immunization History   Administered Date(s) Administered    DTAP 2005, 2006, 2006, 2006, 2006, 2006, 2009    DTAP-IPV  12/04/2009    DTAP/HEP B/IPV Combined 11/25/2005, 02/09/2006    FLULAVAL 6 months & older 0.5 ml Prefilled syringe (12219) 10/05/2017, 01/11/2021    FLUZONE 6 months and older PFS 0.5 ml (88987) 10/25/2014, 10/05/2017, 01/11/2021    HEP A 09/28/2006, 01/31/2008    HEP A,Ped/Adol,(2 Dose) 09/28/2006, 01/31/2008    HEP B 09/19/2005, 11/25/2005, 02/09/2006, 04/17/2006    HEP B, Ped/Adol 09/19/2005, 04/17/2006    HIB 11/25/2005, 02/09/2006, 04/17/2006, 04/21/2011    HIB 3 Dose Schedule 04/21/2011    Hib, Unspecified Formulation 11/25/2005, 02/09/2006, 04/17/2006    Hpv Virus Vaccine 9 Aydee Im 01/11/2021, 10/12/2023    IPV 11/25/2005, 02/09/2006, 04/17/2006, 12/04/2009    Influenza 12/29/2006, 01/30/2007, 01/31/2008, 11/20/2008, 12/04/2009    Influenza Vaccine, trivalent (IIV3), PF 0.5mL (30318) 01/28/2025    MMR 09/28/2006, 12/04/2009    Meningococcal Vaccine 10/05/2017    Meningococcal-Menactra 10/05/2017    Meningococcal-Menveo 10-55 YEARS 10/12/2023    Pneumococcal (Prevnar 13) 11/25/2005, 02/09/2006, 04/17/2006, 12/29/2006, 04/21/2011    Pneumococcal (Prevnar 7) 11/25/2005, 02/09/2006, 04/17/2006, 12/29/2006    TDAP 10/05/2017    Varicella 09/28/2006, 12/04/2009   Pended Date(s) Pended    Hpv Virus Vaccine 9 Aydee Im 01/11/2021    TDAP 05/28/2025         Objective:     Objective:       Vitals:    05/28/25 1230   BP: 110/60   Pulse: 111   Weight: 125 lb (56.7 kg)   Height: 59\"         Body mass index is 25.25 kg/m².    General: AAO.NAD.   CVS exam: normal peripheral perfusion  Chest: non-labored breathing, no tachypnea   Abdominal exam: gravid   Pelvic exam:    VULVA: normal appearing vulva with no masses, tenderness or lesions  PERINEUM:  normal appearing, no lesions   URETHRAL MEATUS:  normal appearing, no lesions   VAGINA: normal appearing vagina with normal color and discharge, no lesions  CERVIX: normal appearing cervix without discharge or lesions  UTERUS: deferred  ADNEXA: deferred  PERIRECTAL: normal appearing,  no lesions     Labs:  Prenatal Results       Initial Prenatal Labs EDWARD (GA 0-24w)       Test Value Reference Range Date Time    ABO Group        RH Type        Antibody Screen OB        Rubella Titer OB        Hep B Surf Ag OB        RPR Serology        TREP        HIV Combo Result OB        HGB        HCT        MCV        Platelets        Urine Culture  No Growth at 18-24 hrs.   03/04/25 1638    HCV                  Additional Prenatal Labs (GA 0-24w)       Test Value Reference Range Date Time    Chlamydia with Pap  Negative  Negative 03/04/25 1620       Negative  Negative 12/03/24 1427    GC with Pap  Negative  Negative 03/04/25 1620       Negative  Negative 12/03/24 1427    Chlamydia        GC         Pap Smear  NEGATIVE FOR INTRAEPITHELIAL LESION OR MALIGNANCY.   11/11/24 1332    HPV        Sickel Cell Solubility HGB                  2nd Trimester Labs (GA 24-41w)       Test Value Reference Range Date Time    Antibody Screen OB        Serology RPR        HGB  11.8 g/dL 12.0 - 16.0 05/16/25 0950    HCT  35.7 % 35.0 - 48.0 05/16/25 0950    Glucose 1 hour  141 mg/dL See comment 04/30/25 1547    Glucose Melody 3 hr Gestational Fasting        1 Hour glucose        2 Hour glucose        3 hour glucose        Ferritin  31 ng/mL 50 - 306 05/16/25 0950              3rd Trimester (28-41w)       Test Value Reference Range Date Time    Blood Type        Antibody screen        Group B Strep OB        HGB        HCT        HIV Combo Result OB        Rapid HIV        Ferritin                  First Trimester & Genetic Testing (GA 0-40w)       Test Value Reference Range Date Time    MaternaT-21 (T13)        MaternaT-21 (T18)        MaternaT-21 T(T21)        VISIBILI T (T21)        VISIBILI T (T18)        QNatal T13        QNatal T18        QNatal T21        Cystic Fibrosis Screen [32]        Cystic Fibrosis Screen [165]        Cystic Fibrosis Screen [165]        Cystic Fibrosis Screen [165]        Cystic Fibrosis Screen [165]         RH d ([employer])        CVS        Nuchal Screening        NIPT [T13]        NIPT [T18]        NIPT [T21]        Carrier Screen        First Trimester Screen        Angela - NIPT [T13]        Angela - NIPT [T18]        Angela - NIPT [T21]        Angela - Carrier Screen                  Genetic Screening (GA 0-45w)       Test Value Reference Range Date Time    AFP Tetra-Patient's HCG        AFP Tetra-Mom for HCG        AFP Tetra-Patient's UE3        AFP Tetra-Mom for UE3        AFP Tetra-Patient's ADRIAN        AFP Tetra-Mom for ADRIAN        AFP Tetra-Patient's AFP        AFP Tetra-Mom for AFP        AFP. Spina Bifida        Quad Screen (Quest)        AFP        SMA                  Legend    ^: Historical                                  All of the findings and plan were discussed with the patient.  She notes understanding and agrees with the plan of care.  All questions were answered to the best of my ability at this time.      Total patient time was 28 minutes in evaluation, consultation, and coordination of care. This included face to face and non-face to face actions. The patient's questions and concerns were addressed.       Kika Banks MD   [facility] - OBGYN        Note to patient and family   The 21st Century Cures Act makes medical notes available to patients in the interest of transparency.  However, please be advised that this is a medical document.  It is intended as [id number]-wb-gdrw communication.  It is written and medical language may contain abbreviations or verbiage that are technical and unfamiliar.  It may appear blunt or direct.  Medical documents are intended to carry relevant information, facts as evident, and the clinical opinion of the practitioner.      This note could include assistance by Dragon voice recognition. Errors in content may be related to improper recognition by the system; efforts to review and correct have been done but errors may still exist.

## 2025-05-28 NOTE — PATIENT INSTRUCTIONS
Tdap Vaccine: What You Need To Know    1.  Why Get Vaccinated:    Tetanus, diphtheria, and pertussis can be very serious diseases, even for adolescents and adults.  Tdap vaccine can protect us from these diseases.    Tetanus (Lockjaw) causes painful muscle tightening and stiffness, usually all over the body.  It can lead to tightening of muscles in the head and neck so you can’t open your mouth, swallow, or sometimes even breathe.  Tetanus kills about 1 out of 5 people who are infected.  Diphtheria can cause a thick coating to form in the back of the throat, which can lead to breathing problems, paralysis, heart failure, and death.  Pertussis (Whooping Cough) causes severe coughing spells, which can cause difficulty breathing, vomiting, and disturbed sleep.  It can also lead to weight loss, incontinence, and rib fractures.  Up to 2 in 100 adolescents and 5 in 100 adults with pertussis are hospitalized or have complications, which could include pneumonia or death.    These diseases are caused by bacteria.  Diphtheria and pertussis are spread from person to person through coughing or sneezing.  Tetanus enters the body through cuts, scratches, or wounds.    Before vaccines, the [place] saw as many as 200,000 cases a year of diphtheria and pertussis, and hundreds of cases of tetanus.  Since vaccination began, tetanus and diphtheria have dropped by about 99% and pertussis by about 80%.    2.  Tdap Vaccine in Pregnancy    Pregnant women should get a dose of Tdap during every pregnancy in the third trimester, to protect the  from pertussis.  Infants are most at risk for severe, life-threatening complications from pertussis.      A similar vaccine, called Td, protects from tetanus and diphtheria, but not pertussis.   A Td booster should be given every 10 years.  Tdap may be given as one of these boosters if you have not already gotten a dose.  Tdap may also be given after a severe cut or burn to prevent tetanus  infection.          Medications Safe in Pregnancy  The following over-the-counter medications may be taken safely after 12 weeks gestation by any patient who is pregnant.  Please follow the instructions on the package for adult dosage.  If you experience any symptoms prior to 12 weeks, please call the office at [phone].      Colds/Congestion: Flonase, Saline Nasal Spray, Neti Pot, Plain Robitussin, Robitussin DM, Mucinex DM, Vicks 44 E, Vicks Vapor rub, Cough drops without Zinc or Sudafed.   Contact your doctor if you have a persistent fever over 100.4, shortness of breath, coughing up green mucus, or a sore throat that persists from more than 3 days    Diarrhea: Imodium, Maalox Anti-Diarrheal or Kaopectate  Contact the office if you have diarrhea for more than 3 days.    Allergies: Benadryl, Claritin or Zyrtec    Hemorrhoids: Tucks pads, Preparation H, Annusol, Sitz bath or Witch hazel  Eat a high fiber diet and drink plenty of fluids.    Yeast: Monistat 3 or 7  Call if your symptoms do not improve, or if you experience vaginal bleeding, or a watery discharge.    Constipation: Metamucil, Colace, fiber supplement, Milk of Magnesia or Dulcolax  Drink plenty of fluids, eat high-fiber foods and take the above laxatives sporadically.     Headache or Mild aches and pain: Extra Strength Tylenol     Gas: Gas X, Gelusil, Papaya Tablets, Maalox, Mylicon or Mylanta Gas    Heartburn: Tums, Mylanta, Pepcid AC or Maalox    Sore throat: Alcohol free Chloraseptic spray or Lozenges     Nausea and Vomiting: ½ tablet Unisom plus 100mg of Vitamin B6 at bedtime (may increase B6 up to 200mg per day), Josephine root tea or raspberry leaf tea    Insomnia: Tylenol PM, Vitamin B6 50mg, warm bath or milk, Unisom, Nytol or Sominex.     We have listed a few medications for common symptoms seen in pregnancy.  Please contact the office if you are unsure about any over the counter medications that are not listed above.

## 2025-05-28 NOTE — PROGRESS NOTES
Outpatient Maternal-Fetal Medicine Consultation    Dear Dr. Rollins    Thank you for requesting ultrasound evaluation and maternal fetal medicine consultation on your patient Leah Craig.  As you are aware she is a 19 year old female  with a bello pregnancy and an Estimated Date of Delivery: 25.  A maternal-fetal medicine f/u is today. Her prenatal records and labs were reviewed.    Stools firmer but still not fully formed.  Doing well with remicade  ROS    HISTORY  OB History    Para Term  AB Living   1        SAB IAB Ectopic Multiple Live Births             # Outcome Date GA Lbr Marty/2nd Weight Sex Type Anes PTL Lv   1 Current                Allergies:  Allergies[1]   Current Meds:  Current Outpatient Medications   Medication Sig Dispense Refill    Biotin 1 MG Oral Cap Take by mouth.      Mesalamine 4 g Rectal Enema       Prenatal 27-0.8 MG Oral Tab Take 1 tablet by mouth in the morning.      predniSONE 10 MG Oral Tab Take 1 tablet (10 mg total) by mouth in the morning.      Mesalamine  MG Oral Cap CR Take 4 capsules (1,000 mg total) by mouth daily.      famotidine 20 MG Oral Tab Take 1 tablet (20 mg total) by mouth in the morning and 1 tablet (20 mg total) before bedtime.      Ferrous Sulfate 325 (65 Fe) MG Oral Tab Take 1 tablet (325 mg total) by mouth in the morning.      mesalamine 1.2 g Oral Tab EC Take 4 tablets (4.8 g total) by mouth daily with breakfast.      fluticasone propionate 50 MCG/ACT Nasal Suspension 1 spray by Nasal route daily. (Patient not taking: Reported on 2025)      albuterol 108 (90 Base) MCG/ACT Inhalation Aero Soln Inhale 2 puffs into the lungs. (Patient not taking: Reported on 2025)      prenatal vitamin with DHA 27-0.8-228 MG Oral Cap Take 1 capsule by mouth daily. 90 capsule 3        HISTORY:  Past Medical History:    Ulcerative colitis (HCC)      No past surgical history on file.   No family history on file.   Social History      Socioeconomic History    Marital status: Single   Tobacco Use    Smoking status: Never    Smokeless tobacco: Never   Vaping Use    Vaping status: Never Used   Substance and Sexual Activity    Alcohol use: No    Drug use: Yes     Types: Cannabis     Comment: occ     Social Drivers of Health     Food Insecurity: Low Risk  (3/25/2025)    Received from [facility]    Food Insecurity     Have there been times that your food ran out, and you didn't have money to get more?: No     Are there times that you worry that this might happen?: No   Transportation Needs: Low Risk  (3/25/2025)    Received from [facility]    Transportation Needs     Do you have trouble getting transportation to medical appointments?: No   Housing Stability: Low Risk  (3/25/2025)    Received from [facility]    Housing Stability     Are you worried that your electric, gas, oil, or water might be shut off?: No     Are you concerned about having a safe and reliable place to live?: No          PHYSICAL EXAMINATION:  /71 (BP Location: Right arm, Patient Position: Sitting, Cuff Size: adult)   Pulse 94   Ht 4' 11\" (1.499 m)   Wt 123 lb (55.8 kg)   LMP  (LMP Unknown)   BMI 24.84 kg/m²   Physical Exam  Constitutional:       Appearance: Normal appearance.   Abdominal:      Palpations: Abdomen is soft.      Tenderness: There is no abdominal tenderness.   Neurological:      Mental Status: She is alert.   Psychiatric:         Mood and Affect: Mood normal.         Behavior: Behavior normal.           OBSTETRIC ULTRASOUND  The patient had a follow-up growth ultrasound today which revealed normal interval fetal growth.  Ultrasound Findings:    Single IUP in breech presentation.    Placenta is posterior.   Cardiac activity is present at 159 bpm  EFW 1094 g ( 2 lb 7 oz); 44%.    MVP is 4.5 cm . BARRETT 13.6 cm    The fetal measurements are consistent with established MANDIE. No gross ultrasound  evidence of structural abnormalities are seen today. The patient understands that ultrasound cannot rule out all structural and chromosomal abnormalities.   See PACS/Imaging Tab For Complete Ultrasound Report  I interpreted the results and reviewed them with the patient.    DISCUSSION  During her visit we discussed and reviewed the following issues:  INFLAMMATORY BOWEL DISEASE IN PREGNANCY     She was diagnosed in late November/December of  with ulcerative colitis.  She was recently discharged from an admission for her ulcerative colitis.  She received her first dose of remicade and is on mesalamine along with  prednisone.  While she feels some better than she did in the hospital, she continues to have frequent bowel movements.  This is a desired pregnancy.     Please see previous [facility] detailed discussion.      NUTRITION  Consultation with a nutritionist should be considered to offer advice on eating a well-balanced, healthy diet.  Folate supplementation (2 mg daily) should be recommended in patients with IBD on low residue diets, with ileal involvement, and patients on medications that interfere with folic acid metabolism (eg, sulfasalazine).  Iron and B12 levels should therefore be checked in the first trimester and supplementation should be provided as needed     MODE OF DELIVERY  Patients with active perineal disease or rectal involvement with Crohn disease should undergo  delivery in order to avoid perineal trauma from vaginal birth that can trigger or worsen existing perineal disease. The mode of delivery in all other patients with inflammatory bowel disease should be dictated by obstetric necessity. In patients with an ileoanal anastomosis or J pouch, many advocate planned  delivery as well, though data suggest no long-term worsening of pouch function.\Vaginal delivery can be attempted in women with a colostomy, ileostomy, or continent ileostomy and has not been associated with an increased  risk of ostomy complications.    Mesalamine - Adverse events have not been observed in animal reproduction studies. Mesalamine is known to cross the placenta. An increased rate of congenital malformations has not been observed in human studies.  birth, still birth and decreased birth weight have been observed; however, these events may also be due to maternal disease.    When treatment for inflammatory bowel disease is needed during pregnancy, mesalamine may be used, although products with DBP should be avoided (Tamiko 2012; Dennis 2009).      Prednisone and prednisolone cross the human placenta. Some studies have shown an association between first trimester prednisone use and oral clefts; adverse events in the fetus/ have been noted in case reports following large doses of systemic corticosteroids during pregnancy.  It is compatible with breast feeding.       Imaging:      CT ABDOMEN PELVIS W CONTRAST 2024  IMPRESSION:   1.  Circumferential colonic wall thickening beginning in the distal transverse colon and extending through the mid to distal sigmoid colon. Subcentimeter pericolonic lymph nodes and prominence of the vasa recta. Findings most consistent with nonspecific colitis. Follow-up endoscopy recommended.  2.   Thick walled peripherally enhancing and collapsing left ovarian cyst. Mild free fluid in the dependent pelvis.  3.  See other findings above.       25   PATIENT NAME: Leah Craig   MRN:[id number]     DATE OF ADMISSION: 1/15/2025  DATE OF DISCHARGE: 25  DISCHARGE LOCATION: home   CONDITION AT DISCHARGE: good   DISCHARGING PHYSICIAN: Maninder Calzada DO     TEST RESULTS PENDING UPON DISCHARGE: None     REASON FOR HOSPITALIZATION:   Colitis [K52.9]  Anemia [D64.9]  GI bleed [K92.2]  Abdominal pain [R10.9]  First trimester pregnancy [Z34.91]     DISCHARGE DIAGNOSIS:  Hematochezia     HOSPITAL COURSE:   Newly diagnosed ulcerative colitis  Acute on chronic blood loss & Fe def  anemia  - GI consulted, s/p flex sig w/biopsies 1/16/25 (path confirmed UC)  - Pred 40x7d, 30x7d, 20x7d, 10x7d, 5x7d  - Cont PO mesalamine indefinitely, rectal mesalamine QHS for 1 month  - PO pepcid BID for GI ppx  - PO iron   - PRN tylenol/norco  - F/u with Dr. Fink to arrange future remicade infusion (s/p 1st dose 1/18/25)    Infliximab (Remicade) is an immunosuppressant agent (monoclonal antibody and TNF blocking agent) used to treat a number of conditions including ankylosing spondylitis, crohn's disease, ulcerative colitis, rheumatoid arthritis, psoriasis and psoriatic arthritis.  It is considered a category B medication in human pregnancy.      There have not been animal studies performed.  Infliximab crosses the placenta and can be detected in serum of infant for up to 6 months following in utero exposure.  Therefore, it is recommended to wait >6 months before administering any live vaccine to infants exposed to infliximab in utero.  When biologic agents such as infliximab are needed during pregnancy, it is recommended to hold therapy after 30 weeks gestation.    Healthcare providers are encouraged to enroll women on infliximab during pregnanc in the MotherToBaby Autoimmune Disease Study by contacting the [employer] ([employer]) [phone].    Signs and symptoms of preeclampsia were reviewed.      IMPRESSION:  IUP at 28w0d   Newly diagnosed Ulcerative colitis  Appropriate growth    RECOMMENDATIONS:  Continue care with Dr. Rollins  Monthly growth US in third trimester with BPP at or beyond 32 weeks.  Continue medications for ulcerative colitis and co-management with GI       Thank you for allowing me to participate in the care of your patient.  Please do not hesitate to contact me if additional questions or concerns arise.      Pauline Paredes M.D.    20 minutes spent in review of records, patient consultation, documentation and coordination of care.  The relevant  clinical matter(s) are summarized above.     Note to patient and family  The 21st Century Cures Act makes medical notes available to patients in the interest of transparency.  However, please be advised that this is a medical document.  It is intended as qwok-vf-unsi communication.  It is written and medical language may contain abbreviations or verbiage that are technical and unfamiliar.  It may appear blunt or direct.  Medical documents are intended to carry relevant information, facts as evident, and the clinical opinion of the practitioner.         [1] No Known Allergies

## 2025-05-29 ENCOUNTER — PATIENT MESSAGE (OUTPATIENT)
Dept: OBGYN CLINIC | Facility: CLINIC | Age: 20
End: 2025-05-29

## 2025-05-29 LAB
BV BACTERIA DNA VAG QL NAA+PROBE: NEGATIVE
C GLABRATA DNA VAG QL NAA+PROBE: NEGATIVE
C KRUSEI DNA VAG QL NAA+PROBE: NEGATIVE
C TRACH DNA SPEC QL NAA+PROBE: NEGATIVE
CANDIDA DNA VAG QL NAA+PROBE: NEGATIVE
N GONORRHOEA DNA SPEC QL NAA+PROBE: NEGATIVE
T VAGINALIS DNA VAG QL NAA+PROBE: NEGATIVE

## 2025-06-02 ENCOUNTER — PATIENT MESSAGE (OUTPATIENT)
Dept: OBGYN CLINIC | Facility: CLINIC | Age: 20
End: 2025-06-02

## 2025-06-06 ENCOUNTER — TELEPHONE (OUTPATIENT)
Age: 20
End: 2025-06-06

## 2025-06-06 NOTE — TELEPHONE ENCOUNTER
Hello,  Sorry I missed you - I am reaching out from the [facility] Navigation department, following up on an order from your provider's office to assist in connecting you with resources for care. If you would like to discuss this further, please give us a call back at [phone], or for more immediate assistance you can contact our 24-hour help line at [phone] We look forward to hearing from you soon.

## 2025-06-12 ENCOUNTER — ROUTINE PRENATAL (OUTPATIENT)
Dept: OBGYN CLINIC | Facility: CLINIC | Age: 20
End: 2025-06-12
Payer: MEDICAID

## 2025-06-12 VITALS
SYSTOLIC BLOOD PRESSURE: 96 MMHG | HEIGHT: 59 IN | HEART RATE: 91 BPM | WEIGHT: 125 LBS | DIASTOLIC BLOOD PRESSURE: 60 MMHG | BODY MASS INDEX: 25.2 KG/M2

## 2025-06-12 DIAGNOSIS — Z34.00 SUPERVISION OF NORMAL FIRST PREGNANCY, ANTEPARTUM (HCC): Primary | ICD-10-CM

## 2025-06-12 PROCEDURE — 99213 OFFICE O/P EST LOW 20 MIN: CPT | Performed by: NURSE PRACTITIONER

## 2025-06-12 NOTE — PROGRESS NOTES
Chief Complaint   Patient presents with    Prenatal Care     ABIGAIL      19 year old  at 30w1d who presents for routine OB visit without complaints. Doing well.   Patient denies any bleeding, leaking fluid, cramping, or contractions.  Assessment/Plan:   30w1d doing well.  Continue routine prenatal care  Rody obrien reminded  Reviewed  labor signs and symptoms.    She has depression, she denies any concern or thoughts of self harm or harm to others. She is seeing a therapist. She declines medication or the need for a referral for [facility] for medication management.     Encouraged completion of her labs    Growth US with MFM is scheduled    ABIGAIL 2 weeks    Subjective:   Review of Systems:  General: no complaints per category. See HPI for additional information.   Breast: no complaints per category. See HPI for additional information.   Respiratory: no complaints per category. See HPI for additional information.   Cardiovascular: no complaints per category. See HPI for additional information.   GI: no complaints per category. See HPI for additional information.   : no complaints per category. See HPI for additional information.   Heme: no complaints per category. See HPI for additional information.   Obstetrical History:   OB History    Para Term  AB Living   1        SAB IAB Ectopic Multiple Live Births             # Outcome Date GA Lbr Marty/2nd Weight Sex Type Anes PTL Lv   1 Current              Gynecological History: na  Medications:  Current Medications[1]   Allergies:  Allergies[2]  Past Medical History:  Past Medical History[3]  Past Surgical History:  Past Surgical History[4]  Immunizations:   Immunization History   Administered Date(s) Administered    DTAP 2005, 2006, 2006, 2006, 2006, 2006, 2009    DTAP-IPV 2009    DTAP/HEP B/IPV Combined 2005, 2006    FLULAVAL 6 months & older 0.5 ml Prefilled syringe ([id number]) 10/05/2017,  2021    FLUZONE 6 months and older PFS 0.5 ml ([id number]) 10/25/2014, 10/05/2017, 2021    HEP A 2006, 2008    HEP A,Ped/Adol,(2 Dose) 2006, 2008    HEP B 2005, 2005, 2006, 2006    HEP B, Ped/Adol 2005, 2006    HIB 2005, 2006, 2006, 2011    HIB 3 Dose Schedule 2011    Hib, Unspecified Formulation 2005, 2006, 2006    Hpv Virus Vaccine 9 Aydee Im 2021, 10/12/2023    IPV 2005, 2006, 2006, 2009    Influenza 2006, 2007, 2008, 2008, 2009    Influenza Vaccine, trivalent (IIV3), PF 0.5mL ([id number]) 2025    MMR 2006, 2009    Meningococcal Vaccine 10/05/2017    Meningococcal-Menactra 10/05/2017    Meningococcal-Menveo 10-55 YEARS 10/12/2023    Pneumococcal (Prevnar 13) 2005, 2006, 2006, 2006, 2011    Pneumococcal (Prevnar 7) 2005, 2006, 2006, 2006    TDAP 10/05/2017, 2025    Varicella 2006, 2009   Pended Date(s) Pended    Hpv Virus Vaccine 9 Aydee Im 2021     Objective:     Vitals:    25 1648   BP: 96/60   Pulse: 91   Weight: 125 lb (56.7 kg)   Height: 59\"     Body mass index is 25.25 kg/m².  Physical Examination:  General appearance: Well dressed, well nourished in no apparent distress  Neurologic/Psychiatric: Alert and oriented to person, place and time, mood normal, affect appropriate  Abdomen: Soft, non-tender, non-distended-   Skin: General inspection- no rashes, lesions or discoloration  Patient Active Problem List    Diagnosis    Antepartum anemia (HCC)     Referral to hematology       Supervision of normal first pregnancy, antepartum (HCC)     [ ] initial OB panel  [ ] genetic testing  [x] Level II scan      Ulcerative colitis (HCC)     Taking Infiximab (remicade), mesalamine and prednisone, continuing care with GI thru [facility].   [x] Referral to  MFM    Monthly growth US in third trimester with BPP at or beyond 32 weeks: normal growth a 28 weeks  Continue medications for ulcerative colitis and co-management with GI      Psoriasis of scalp       Total patient time was 20 minutes in reviewing paper/electronic records, reviewing test results from outside care provider, obtaining history, evaluating the patient, consultation, discussing treatment options, coordination of care and completing documentation. This included face to face and non-face to face actions. The patient's questions and concerns were addressed.          [1]    mesalamine 1.2 g Oral Tab EC Take 4 tablets (4.8 g total) by mouth daily with breakfast.      Biotin 1 MG Oral Cap Take by mouth.      Mesalamine 4 g Rectal Enema       prenatal vitamin with DHA 27-0.8-228 MG Oral Cap Take 1 capsule by mouth daily. 90 capsule 3    predniSONE 10 MG Oral Tab Take 1 tablet (10 mg total) by mouth in the morning.      famotidine 20 MG Oral Tab Take 1 tablet (20 mg total) by mouth in the morning and 1 tablet (20 mg total) before bedtime.     [2] No Known Allergies  [3]   Past Medical History:   Ulcerative colitis (HCC)   [4] History reviewed. No pertinent surgical history.

## 2025-06-25 ENCOUNTER — ROUTINE PRENATAL (OUTPATIENT)
Dept: OBGYN CLINIC | Facility: CLINIC | Age: 20
End: 2025-06-25
Payer: MEDICAID

## 2025-06-25 ENCOUNTER — LAB ENCOUNTER (OUTPATIENT)
Dept: LAB | Age: 20
End: 2025-06-25
Attending: OBSTETRICS & GYNECOLOGY
Payer: MEDICAID

## 2025-06-25 ENCOUNTER — OFFICE VISIT (OUTPATIENT)
Dept: PERINATAL CARE | Facility: HOSPITAL | Age: 20
End: 2025-06-25
Attending: OBSTETRICS & GYNECOLOGY
Payer: MEDICAID

## 2025-06-25 VITALS
BODY MASS INDEX: 26.06 KG/M2 | DIASTOLIC BLOOD PRESSURE: 66 MMHG | HEIGHT: 59 IN | SYSTOLIC BLOOD PRESSURE: 102 MMHG | HEART RATE: 81 BPM | WEIGHT: 129.25 LBS

## 2025-06-25 VITALS
DIASTOLIC BLOOD PRESSURE: 72 MMHG | SYSTOLIC BLOOD PRESSURE: 106 MMHG | WEIGHT: 129 LBS | HEIGHT: 59 IN | BODY MASS INDEX: 26 KG/M2 | HEART RATE: 83 BPM

## 2025-06-25 DIAGNOSIS — K51.919 ULCERATIVE COLITIS WITH COMPLICATION, UNSPECIFIED LOCATION (HCC): Primary | ICD-10-CM

## 2025-06-25 DIAGNOSIS — L40.9 PSORIASIS OF SCALP: ICD-10-CM

## 2025-06-25 DIAGNOSIS — K51.919 ULCERATIVE COLITIS WITH COMPLICATION, UNSPECIFIED LOCATION (HCC): ICD-10-CM

## 2025-06-25 DIAGNOSIS — O09.893 HIGH RISK TEEN PREGNANCY IN THIRD TRIMESTER (HCC): ICD-10-CM

## 2025-06-25 DIAGNOSIS — Z11.3 SCREENING EXAMINATION FOR STD (SEXUALLY TRANSMITTED DISEASE): ICD-10-CM

## 2025-06-25 DIAGNOSIS — O99.810 ABNORMAL MATERNAL GLUCOSE TOLERANCE, ANTEPARTUM (HCC): ICD-10-CM

## 2025-06-25 DIAGNOSIS — Z34.01 ENCOUNTER FOR SUPERVISION OF NORMAL FIRST PREGNANCY IN FIRST TRIMESTER (HCC): Primary | ICD-10-CM

## 2025-06-25 DIAGNOSIS — O99.019 ANTEPARTUM ANEMIA (HCC): ICD-10-CM

## 2025-06-25 DIAGNOSIS — Z34.00 SUPERVISION OF NORMAL FIRST PREGNANCY, ANTEPARTUM (HCC): Primary | ICD-10-CM

## 2025-06-25 DIAGNOSIS — K51.90 ULCERATIVE COLITIS (HCC): ICD-10-CM

## 2025-06-25 DIAGNOSIS — Z34.90 ENCOUNTER FOR SUPERVISION OF NORMAL PREGNANCY, ANTEPARTUM, UNSPECIFIED GRAVIDITY (HCC): ICD-10-CM

## 2025-06-25 LAB
ALBUMIN SERPL-MCNC: 4 G/DL (ref 3.2–4.8)
ALBUMIN/GLOB SERPL: 1.3 {RATIO} (ref 1–2)
ALP LIVER SERPL-CCNC: 143 U/L (ref 52–144)
ALT SERPL-CCNC: 12 U/L (ref 10–49)
ANION GAP SERPL CALC-SCNC: 10 MMOL/L (ref 0–18)
ANTIBODY SCREEN: NEGATIVE
AST SERPL-CCNC: 19 U/L (ref ?–34)
BASOPHILS # BLD AUTO: 0.02 X10(3) UL (ref 0–0.2)
BASOPHILS NFR BLD AUTO: 0.3 %
BILIRUB SERPL-MCNC: 0.3 MG/DL (ref 0.3–1.2)
BUN BLD-MCNC: <5 MG/DL (ref 9–23)
CALCIUM BLD-MCNC: 9.4 MG/DL (ref 8.7–10.6)
CHLORIDE SERPL-SCNC: 105 MMOL/L (ref 98–112)
CO2 SERPL-SCNC: 24 MMOL/L (ref 21–32)
CREAT BLD-MCNC: 0.53 MG/DL (ref 0.55–1.02)
EGFRCR SERPLBLD CKD-EPI 2021: 137 ML/MIN/1.73M2 (ref 60–?)
EOSINOPHIL # BLD AUTO: 0.07 X10(3) UL (ref 0–0.7)
EOSINOPHIL NFR BLD AUTO: 0.9 %
ERYTHROCYTE [DISTWIDTH] IN BLOOD BY AUTOMATED COUNT: 15.3 %
FASTING STATUS PATIENT QL REPORTED: NO
GLOBULIN PLAS-MCNC: 3.1 G/DL (ref 2–3.5)
GLUCOSE BLD-MCNC: 76 MG/DL (ref 70–99)
HAV IGM SER QL: NONREACTIVE
HBV CORE IGM SER QL: NONREACTIVE
HBV SURFACE AG SERPL QL IA: NONREACTIVE
HCT VFR BLD AUTO: 34.2 % (ref 35–48)
HCV AB SERPL QL IA: NONREACTIVE
HGB BLD-MCNC: 11.2 G/DL (ref 12–16)
IMM GRANULOCYTES # BLD AUTO: 0.02 X10(3) UL (ref 0–1)
IMM GRANULOCYTES NFR BLD: 0.3 %
LYMPHOCYTES # BLD AUTO: 2.88 X10(3) UL (ref 1.5–5)
LYMPHOCYTES NFR BLD AUTO: 38.2 %
MCH RBC QN AUTO: 27.2 PG (ref 26–34)
MCHC RBC AUTO-ENTMCNC: 32.7 G/DL (ref 31–37)
MCV RBC AUTO: 83 FL (ref 80–100)
MONOCYTES # BLD AUTO: 0.56 X10(3) UL (ref 0.1–1)
MONOCYTES NFR BLD AUTO: 7.4 %
NEUTROPHILS # BLD AUTO: 3.98 X10 (3) UL (ref 1.5–7.7)
NEUTROPHILS # BLD AUTO: 3.98 X10(3) UL (ref 1.5–7.7)
NEUTROPHILS NFR BLD AUTO: 52.9 %
PLATELET # BLD AUTO: 254 10(3)UL (ref 150–450)
POTASSIUM SERPL-SCNC: 3.9 MMOL/L (ref 3.5–5.1)
PROT SERPL-MCNC: 7.1 G/DL (ref 5.7–8.2)
RBC # BLD AUTO: 4.12 X10(6)UL (ref 3.8–5.3)
RH BLOOD TYPE: POSITIVE
RUBV IGG SER QL: POSITIVE
RUBV IGG SER-ACNC: 13 IU/ML (ref 10–?)
SODIUM SERPL-SCNC: 139 MMOL/L (ref 136–145)
T PALLIDUM AB SER QL IA: NONREACTIVE
WBC # BLD AUTO: 7.5 X10(3) UL (ref 4–11)

## 2025-06-25 PROCEDURE — 87077 CULTURE AEROBIC IDENTIFY: CPT

## 2025-06-25 PROCEDURE — 83021 HEMOGLOBIN CHROMOTOGRAPHY: CPT

## 2025-06-25 PROCEDURE — 36415 COLL VENOUS BLD VENIPUNCTURE: CPT

## 2025-06-25 PROCEDURE — 87591 N.GONORRHOEAE DNA AMP PROB: CPT

## 2025-06-25 PROCEDURE — 76816 OB US FOLLOW-UP PER FETUS: CPT | Performed by: OBSTETRICS & GYNECOLOGY

## 2025-06-25 PROCEDURE — 86901 BLOOD TYPING SEROLOGIC RH(D): CPT

## 2025-06-25 PROCEDURE — 83020 HEMOGLOBIN ELECTROPHORESIS: CPT

## 2025-06-25 PROCEDURE — 87086 URINE CULTURE/COLONY COUNT: CPT

## 2025-06-25 PROCEDURE — 87491 CHLMYD TRACH DNA AMP PROBE: CPT

## 2025-06-25 PROCEDURE — 86850 RBC ANTIBODY SCREEN: CPT

## 2025-06-25 PROCEDURE — 86900 BLOOD TYPING SEROLOGIC ABO: CPT

## 2025-06-25 PROCEDURE — 76819 FETAL BIOPHYS PROFIL W/O NST: CPT

## 2025-06-25 PROCEDURE — 86780 TREPONEMA PALLIDUM: CPT

## 2025-06-25 PROCEDURE — 80053 COMPREHEN METABOLIC PANEL: CPT

## 2025-06-25 PROCEDURE — 87389 HIV-1 AG W/HIV-1&-2 AB AG IA: CPT

## 2025-06-25 PROCEDURE — 99213 OFFICE O/P EST LOW 20 MIN: CPT | Performed by: STUDENT IN AN ORGANIZED HEALTH CARE EDUCATION/TRAINING PROGRAM

## 2025-06-25 PROCEDURE — 85025 COMPLETE CBC W/AUTO DIFF WBC: CPT

## 2025-06-25 PROCEDURE — 86762 RUBELLA ANTIBODY: CPT

## 2025-06-25 PROCEDURE — 80074 ACUTE HEPATITIS PANEL: CPT

## 2025-06-25 NOTE — PROGRESS NOTES
Pt here for growth ultrasound/BPP  + FM  Pt states feeling irreg uterine cramping, denies vag bleeding and leaking fluid.

## 2025-06-25 NOTE — PATIENT INSTRUCTIONS
Labor Instructions    How do I know if it’s true labor?  One of the most important aspects of any pregnancy is being able to recognize the onset of labor.  Unfortunately, on occasion it can be difficult or confusing, especially if you have had one or more children.  Each labor can begin in a different way even if you have had four or five children.    If this is your first child, it is very common to have labor for an average greater than 10 hours; however, there have been rare instances for labor to be two hours or less for a first time mother. It is more important for you to know if this is your second or third baby to realize that any labor after the first is usually shorter.  There is no way to tell how long or short the labor will be. Therefore, please call us if you are unsure labor has started.       Usually, during the last six weeks of pregnancy, Javier-Cárdenas contractions or “false labor pains occur”.  False labor is generally not very painful though it is not always easy to tell.  You may feel contractions, cramps or uterine tightening somewhere between every 3-30 minutes but they will not continue to get stronger over time.  If you lie down, drink plenty of fluid or walk around, the contractions may go away.   False contractions are very common if you have been active on your feet for several hours.   Women frequently worry about being sent home from the hospital without having their baby (i.e. the labor stopped).  Actually, this is an unnecessary worry, for this is an infrequent occurrence.     True labor usually begins in one of two ways.  In most patients it begins with contractions of the uterus, which are irregular (but not always) in the beginning.  They are cramp-like in character and feel similar to menstrual cramps.  After a while, they become more regular, and they seem to last a little longer, and feel a little sharper.  These symptoms are very important-more important- than the timing of the  contractions.  Having regular (usually closer), longer lasting (35-70 seconds), and sharper (more painful) contractions are the common symptoms of actual labor.  The second way in which labor can begin which occurs in approximately 30% of all patients is the rupture of the bag of water.  This is a sudden gush of watery fluid, usually sufficient to run down your leg and onto the floor, or you may wet a large area of the bed if it happens at night.  There may also be tricking that is uncontrolled.  If you are unsure, please call the office.      When should I call?  When contractions are strong and every 3-5 minutes.  If you have a gush of water or you think you might be leaking fluid.  If you are bleeding heavily.  If your baby is not moving around at least every 1-2 hours.  If you are worried about something.  When you think you are ready to go to the hospital.    Who do I call?  Call the office at [phone].  If the office is closed, the answering service will send a message to the physician on call.  The on call physician will be available for emergency phone calls only.          Can I eat in labor?  It is good to eat a light meal at home before going to the hospital.  Eat foods like crackers, popsicles, soup and fruit.  Avoid foods that are difficult to digest like meat, a lot of dairy products and high fat foods.  DO NOT EAT if you know you are scheduled for a  ().      What will happen when I get to the hospital?  When you arrive at the hospital, you will be admitted and examined.   There are a few factors that will determine if you will be allowed to be up out of bed or if you would need to stay in bed.  The main factors are how well the baby is entering into the pelvis and if the bag of mcgill is in intact or ruptured.  An intravenous (IV) solution will, with exceptions, be started.  This is extremely important especially for the baby.   Your  will be allowed in the room during your  labor. During the delivery, the nurses will inform you of the hospital policy and how many coaches are allowed.  You may desire pain medication or anesthesia for pain.  You probably discussed some aspects of pain medication with us during your prenatal care.  The various options may also have been discussed in Prenatal Classes.  We utilize IV narcotics and epidural anesthesia when our patients request to have them.  If you chose to have no anesthesia, none will be administered.  A local anesthetic may be used at the time of delivery      What should I to bring to the hospital?  Maternity clothes to go home in  You can bring your own night gown to wear after giving birth, but most women prefer to wear the hospital gown because it may get soiled  Nursing Bra if you are planning to breastfeed  Clothes for your baby to go home in  Baby Car Seat.  Be sure you know how to install it correctly. Please install it before going to the hospital  Routine toiletries like toothbrush, shampoo, hairbrush and etc.   You can bring your favorite pillow, but please put a colored pillow case on it so it doesn’t get mixed up with hospital pillows    How long will I stay in the hospital?  The date you leave the hospital may vary depending on the speed of your recovery.  If you have a vaginal delivery, you will stay in the hospital 24-48 hours after your delivery as long as you aren’t having any complications.    If you have had a , you will stay in the hospital 48-72 hours as long as you aren’t having any complications.       Going Home Instructions  There are no set rules as to what you may do each day or week after you are home.  You will receive discharge instructions to help you each day.  Remember, early ambulation in the hospital is to prevent complications.  Do not let this lull you into a false sense of strength or ability to do certain physical acts which may tire you excessively.  Please call the office within a few days  after you are discharged from the hospital to schedule your post-partum visit, which is usually 4-6 weeks after delivery.    Any medications necessary will be discussed on an individual basis.  If you decide to breastfeed your baby, you should continue your prenatal vitamins.  If you do not breastfeed, simply finish the prenatal vitamins you have.      The staff at [facility] wish you a joyous and exciting birth.  If there is anything we can do to make this a better experience for you please do not hesitate to ask.

## 2025-06-25 NOTE — PROGRESS NOTES
Outpatient Maternal-Fetal Medicine Consultation    Dear Dr. Rollins    Thank you for requesting ultrasound evaluation and maternal fetal medicine consultation on your patient Leah Craig.  As you are aware she is a 19 year old female  with a bello pregnancy and an Estimated Date of Delivery: 25.  A maternal-fetal medicine f/u is today. Her prenatal records and labs were reviewed.    Stools firmer but still not fully formed most of the time.  Has a solid stool here and there.  Forgot to go to 2 remicade infusions.  Met with GI last week and has remicade infusion appt next week. Forgot about 3 hr gtt.    She is 4 foot 11 in tall.  Father of the baby was 6 pounds when he was born.  ROS    HISTORY  OB History    Para Term  AB Living   1        SAB IAB Ectopic Multiple Live Births             # Outcome Date GA Lbr Marty/2nd Weight Sex Type Anes PTL Lv   1 Current                Allergies:  Allergies[1]   Current Meds:  Current Outpatient Medications   Medication Sig Dispense Refill    Biotin 1 MG Oral Cap Take by mouth.      prenatal vitamin with DHA 27-0.8-228 MG Oral Cap Take 1 capsule by mouth daily. 90 capsule 3    mesalamine 1.2 g Oral Tab EC Take 4 tablets (4.8 g total) by mouth daily with breakfast. (Patient not taking: Reported on 2025)      Mesalamine 4 g Rectal Enema  (Patient not taking: Reported on 2025)      fluticasone propionate 50 MCG/ACT Nasal Suspension 1 spray by Nasal route daily. (Patient not taking: Reported on 2025)      albuterol 108 (90 Base) MCG/ACT Inhalation Aero Soln Inhale 2 puffs into the lungs. (Patient not taking: Reported on 2025)      predniSONE 10 MG Oral Tab Take 1 tablet (10 mg total) by mouth in the morning. (Patient not taking: Reported on 2025)      Mesalamine  MG Oral Cap CR Take 4 capsules (1,000 mg total) by mouth daily. (Patient not taking: Reported on 2025)      famotidine 20 MG Oral Tab Take 1 tablet  (20 mg total) by mouth in the morning and 1 tablet (20 mg total) before bedtime. (Patient not taking: Reported on 6/25/2025)      Ferrous Sulfate 325 (65 Fe) MG Oral Tab Take 1 tablet (325 mg total) by mouth in the morning. (Patient not taking: Reported on 6/12/2025)          HISTORY:  Past Medical History:    Ulcerative colitis (HCC)      No past surgical history on file.   No family history on file.   Social History     Socioeconomic History    Marital status: Single   Tobacco Use    Smoking status: Never    Smokeless tobacco: Never   Vaping Use    Vaping status: Never Used   Substance and Sexual Activity    Alcohol use: No    Drug use: Yes     Types: Cannabis     Comment: occ     Social Drivers of Health     Food Insecurity: Low Risk  (3/25/2025)    Received from [facility]    Food Insecurity     Have there been times that your food ran out, and you didn't have money to get more?: No     Are there times that you worry that this might happen?: No   Transportation Needs: Low Risk  (3/25/2025)    Received from [facility]    Transportation Needs     Do you have trouble getting transportation to medical appointments?: No   Housing Stability: Low Risk  (3/25/2025)    Received from [facility]    Housing Stability     Are you worried that your electric, gas, oil, or water might be shut off?: No     Are you concerned about having a safe and reliable place to live?: No          PHYSICAL EXAMINATION:  /72 (BP Location: Right arm, Patient Position: Sitting, Cuff Size: adult)   Pulse 83   Ht 4' 11\" (1.499 m)   Wt 129 lb (58.5 kg)   LMP  (LMP Unknown)   BMI 26.05 kg/m²   Physical Exam  Constitutional:       Appearance: Normal appearance.   Abdominal:      Palpations: Abdomen is soft.      Tenderness: There is no abdominal tenderness.   Neurological:      Mental Status: She is alert.   Psychiatric:         Mood and Affect: Mood normal.          Behavior: Behavior normal.           OBSTETRIC ULTRASOUND  The patient had a follow-up growth and BPP ultrasound today which revealed normal interval fetal growth and a BPP of 8/8.   Ultrasound Findings:  Single IUP in cephalic presentation.    Placenta is posterior.   A 3 vessel cord is noted. Normal cord insertion.  Cardiac activity is present at 127 bpm  EFW 1654 g ( 3 lb 10 oz); 30%.    BARRETT is  14.9 cm.  MVP is 5.5 cm  BPP is 8/8.     The fetal measurements are consistent with established MANDIE. No gross ultrasound evidence of structural abnormalities are seen today. The patient understands that ultrasound cannot rule out all structural and chromosomal abnormalities.   See PACS/Imaging Tab For Complete Ultrasound Report  I interpreted the results and reviewed them with the patient.    DISCUSSION  During her visit we discussed and reviewed the following issues:  INFLAMMATORY BOWEL DISEASE IN PREGNANCY     She was diagnosed in late November/December of  with ulcerative colitis.  She was recently discharged from an admission for her ulcerative colitis.  She received her first dose of remicade and is on mesalamine along with  prednisone.  While she feels some better than she did in the hospital, she continues to have frequent bowel movements.  This is a desired pregnancy.     Please see previous [facility] detailed discussion.      NUTRITION  Consultation with a nutritionist should be considered to offer advice on eating a well-balanced, healthy diet.  Folate supplementation (2 mg daily) should be recommended in patients with IBD on low residue diets, with ileal involvement, and patients on medications that interfere with folic acid metabolism (eg, sulfasalazine).  Iron and B12 levels should therefore be checked in the first trimester and supplementation should be provided as needed     MODE OF DELIVERY  Patients with active perineal disease or rectal involvement with Crohn disease should undergo  delivery in  order to avoid perineal trauma from vaginal birth that can trigger or worsen existing perineal disease. The mode of delivery in all other patients with inflammatory bowel disease should be dictated by obstetric necessity. In patients with an ileoanal anastomosis or J pouch, many advocate planned  delivery as well, though data suggest no long-term worsening of pouch function.\Vaginal delivery can be attempted in women with a colostomy, ileostomy, or continent ileostomy and has not been associated with an increased risk of ostomy complications.    Mesalamine - Adverse events have not been observed in animal reproduction studies. Mesalamine is known to cross the placenta. An increased rate of congenital malformations has not been observed in human studies.  birth, still birth and decreased birth weight have been observed; however, these events may also be due to maternal disease.    When treatment for inflammatory bowel disease is needed during pregnancy, mesalamine may be used, although products with DBP should be avoided (Tamiko 2012; Dennis 2009).      Prednisone and prednisolone cross the human placenta. Some studies have shown an association between first trimester prednisone use and oral clefts; adverse events in the fetus/ have been noted in case reports following large doses of systemic corticosteroids during pregnancy.  It is compatible with breast feeding.       Imaging:      CT ABDOMEN PELVIS W CONTRAST 2024  IMPRESSION:   1.  Circumferential colonic wall thickening beginning in the distal transverse colon and extending through the mid to distal sigmoid colon. Subcentimeter pericolonic lymph nodes and prominence of the vasa recta. Findings most consistent with nonspecific colitis. Follow-up endoscopy recommended.  2.   Thick walled peripherally enhancing and collapsing left ovarian cyst. Mild free fluid in the dependent pelvis.  3.  See other findings above.       25   PATIENT  NAME: Leah Craig   MRN:[id number]     DATE OF ADMISSION: 1/15/2025  DATE OF DISCHARGE: 1/21/25  DISCHARGE LOCATION: home   CONDITION AT DISCHARGE: good   DISCHARGING PHYSICIAN: Maninder Calzada DO     TEST RESULTS PENDING UPON DISCHARGE: None     REASON FOR HOSPITALIZATION:   Colitis [K52.9]  Anemia [D64.9]  GI bleed [K92.2]  Abdominal pain [R10.9]  First trimester pregnancy [Z34.91]     DISCHARGE DIAGNOSIS:  Hematochezia     HOSPITAL COURSE:   Newly diagnosed ulcerative colitis  Acute on chronic blood loss & Fe def anemia  - GI consulted, s/p flex sig w/biopsies 1/16/25 (path confirmed UC)  - Pred 40x7d, 30x7d, 20x7d, 10x7d, 5x7d  - Cont PO mesalamine indefinitely, rectal mesalamine QHS for 1 month  - PO pepcid BID for GI ppx  - PO iron   - PRN tylenol/norco  - F/u with Dr. Fink to arrange future remicade infusion (s/p 1st dose 1/18/25)    Infliximab (Remicade) is an immunosuppressant agent (monoclonal antibody and TNF blocking agent) used to treat a number of conditions including ankylosing spondylitis, crohn's disease, ulcerative colitis, rheumatoid arthritis, psoriasis and psoriatic arthritis.  It is considered a category B medication in human pregnancy.      There have not been animal studies performed.  Infliximab crosses the placenta and can be detected in serum of infant for up to 6 months following in utero exposure.  Therefore, it is recommended to wait >6 months before administering any live vaccine to infants exposed to infliximab in utero.  When biologic agents such as infliximab are needed during pregnancy, it is recommended to hold therapy after 30 weeks gestation.    Healthcare providers are encouraged to enroll women on infliximab during pregnanc in the [employer] Autoimmune Disease Study by contacting the [employer] ([employer]) [phone].    Signs and symptoms of preeclampsia were reviewed.    GLUCOSE 1HR OB   Date Value Ref Range Status   04/30/2025 141  (H) See comment mg/dL Final     Comment:     If the plasma glucose level measured after 1 hour is >=130, 135 or 140 mg/dl proceed to \"Glucose Tolerance, 100 gm (0 hr, 1 hr, 2hr, 3hr), Gestational (ADA)\" test on a separate day, as clinically indicated.         No results found for: \"GLUFG\", \"GLU1G\", \"GLU2G\", \"GLU3G\"      IMPRESSION:  IUP at 32w0d   Newly diagnosed Ulcerative colitis  Appropriate growth  Glucose intolerance    RECOMMENDATIONS:  Continue care with Dr. Rollins  Monthly growth US in third trimester with BPP at or beyond 32 weeks.  Continue medications for ulcerative colitis and co-management with GI   Complete 3 hr gtt ASAP      Thank you for allowing me to participate in the care of your patient.  Please do not hesitate to contact me if additional questions or concerns arise.      Pauline Paredes M.D.    20 minutes spent in review of records, patient consultation, documentation and coordination of care.  The relevant clinical matter(s) are summarized above.     Note to patient and family  The 21st Century Cures Act makes medical notes available to patients in the interest of transparency.  However, please be advised that this is a medical document.  It is intended as [id number] communication.  It is written and medical language may contain abbreviations or verbiage that are technical and unfamiliar.  It may appear blunt or direct.  Medical documents are intended to carry relevant information, facts as evident, and the clinical opinion of the practitioner.         [1] No Known Allergies

## 2025-06-25 NOTE — PROGRESS NOTES
[facility]  Obstetrics and Gynecology  Routine OB visit Progress Note    Subjective:     Leah Craig is a 19 year old  at 32w0d who presents for routine OB visit.  Patient reports doing well.  Denies contractions, vaginal bleeding or leakage of fluid.  Good fetal movement.    Had 32 week growth with MFM today, EFW 30%. BPP   Reminded pt to complete all labs as well as to schedule 3hr GTT  Pt following with rheumatology for UC. Pt following with hematology for IV iron.    Assessment:     Leah Craig is a 19 year old  at 32w0d who presents for routine OB visit. Overall doing well.     Problem List Items Addressed This Visit       Psoriasis of scalp    Ulcerative colitis (HCC)    Supervision of normal first pregnancy, antepartum ([facility]) - Primary    Antepartum anemia (HCC)    Abnormal maternal glucose tolerance, antepartum (HCC)           Plan:     Patient Active Problem List    Diagnosis    Abnormal maternal glucose tolerance, antepartum (HCC)     [ ] 3hr GTT -> has not completed to date at 32 week appt, will go to lab today to schedule while getting 1T OB panel done      Antepartum anemia (HCC)     Referral to hematology       Supervision of normal first pregnancy, antepartum ([facility])     [ ] initial OB panel -> pt has not completed to date, reminded at 32 week appt, states will complete today  [/] genetic testing  [x] Level II scan      Ulcerative colitis (HCC)     Taking Infiximab (remicade), mesalamine and prednisone, continuing care with GI thru [facility].   [x] Referral to [facility]    Monthly growth US in third trimester with BPP at or beyond 32 weeks: normal growth a 28 weeks  Continue medications for ulcerative colitis and co-management with GI      Psoriasis of scalp       - continue routine prenatal care   - labor and rupture of membrane precautions provided  -Fetal movement instructions given  Rh unknown  TDAP received, Depression Scale Total: 2 (2025  2:04 PM)       Return  to clinic in 2 weeks for ABIGAIL visit       History/Other:     Review of Systems:  General: no complaints per category. See HPI for additional information.   Breast: no complaints per category. See HPI for additional information.   Respiratory: no complaints per category. See HPI for additional information.   Cardiovascular: no complaints per category. See HPI for additional information.   GI: no complaints per category. See HPI for additional information.   : no complaints per category. See HPI for additional information.   Heme: no complaints per category. See HPI for additional information.     Obstetrical History:   OB History    Para Term  AB Living   1        SAB IAB Ectopic Multiple Live Births             # Outcome Date GA Lbr Marty/2nd Weight Sex Type Anes PTL Lv   1 Current                  Gynecological History:     No LMP recorded (lmp unknown). Patient is pregnant.      Medications:  Current Medications[1]     Allergies:  Allergies[2]    Past Medical History:  Past Medical History[3]    Past Surgical History:  Past Surgical History[4]    Immunizations:   Immunization History   Administered Date(s) Administered    DTAP 2005, 2006, 2006, 2006, 2006, 2006, 2009    DTAP-IPV 2009    DTAP/HEP B/IPV Combined 2005, 2006    FLULAVAL 6 months & older 0.5 ml Prefilled syringe (52065) 10/05/2017, 2021    FLUZONE 6 months and older PFS 0.5 ml (64770) 10/25/2014, 10/05/2017, 2021    HEP A 2006, 2008    HEP A,Ped/Adol,(2 Dose) 2006, 2008    HEP B 2005, 2005, 2006, 2006    HEP B, Ped/Adol 2005, 2006    HIB 2005, 2006, 2006, 2011    HIB 3 Dose Schedule 2011    Hib, Unspecified Formulation 2005, 2006, 2006    Hpv Virus Vaccine 9 Aydee Im 2021, 10/12/2023    IPV 2005, 2006, 2006, 2009    Influenza  12/29/2006, 01/30/2007, 01/31/2008, 11/20/2008, 12/04/2009    Influenza Vaccine, trivalent (IIV3), PF 0.5mL (35647) 01/28/2025    MMR 09/28/2006, 12/04/2009    Meningococcal Vaccine 10/05/2017    Meningococcal-Menactra 10/05/2017    Meningococcal-Menveo 10-55 YEARS 10/12/2023    Pneumococcal (Prevnar 13) 11/25/2005, 02/09/2006, 04/17/2006, 12/29/2006, 04/21/2011    Pneumococcal (Prevnar 7) 11/25/2005, 02/09/2006, 04/17/2006, 12/29/2006    TDAP 10/05/2017, 05/28/2025    Varicella 09/28/2006, 12/04/2009   Pended Date(s) Pended    Hpv Virus Vaccine 9 Aydee Im 01/11/2021         Objective:     Objective:       Vitals:    06/25/25 1410   BP: 102/66   Pulse: 81   Weight: 129 lb 4 oz (58.6 kg)   Height: 59\"         Body mass index is 26.11 kg/m².    General: AAO.NAD.   CVS exam: normal peripheral perfusion  Chest: non-labored breathing, no tachypnea   Abdominal exam: gravid   Pelvic exam: deferred     Labs:  Prenatal Results       Initial Prenatal Labs EDWARD (GA 0-24w)       Test Value Reference Range Date Time    ABO Group        RH Type        Antibody Screen OB        Rubella Titer OB        Hep B Surf Ag OB        RPR Serology        TREP        HIV Combo Result OB        HGB        HCT        MCV        Platelets        Urine Culture  No Growth at 18-24 hrs.   03/04/25 1638    HCV                  Additional Prenatal Labs (GA 0-24w)       Test Value Reference Range Date Time    Chlamydia with Pap  Negative  Negative 05/28/25 1504       Negative  Negative 03/04/25 1620       Negative  Negative 12/03/24 1427    GC with Pap  Negative  Negative 05/28/25 1504       Negative  Negative 03/04/25 1620       Negative  Negative 12/03/24 1427    Chlamydia        GC         Pap Smear  NEGATIVE FOR INTRAEPITHELIAL LESION OR MALIGNANCY.   11/11/24 1332    HPV        Sickel Cell Solubility HGB                  2nd Trimester Labs (GA 24-41w)       Test Value Reference Range Date Time    Antibody Screen OB        Serology RPR        HGB   11.8 g/dL 12.0 - 16.0 05/16/25 0950    HCT  35.7 % 35.0 - 48.0 05/16/25 0950    Glucose 1 hour  141 mg/dL See comment 04/30/25 1547    Glucose Melody 3 hr Gestational Fasting        1 Hour glucose        2 Hour glucose        3 hour glucose        Ferritin  31 ng/mL 50 - 306 05/16/25 0950              3rd Trimester (28-41w)       Test Value Reference Range Date Time    Blood Type        Antibody screen        Group B Strep OB        HGB        HCT        HIV Combo Result OB        Rapid HIV        Ferritin                  First Trimester & Genetic Testing (GA 0-40w)       Test Value Reference Range Date Time    MaternaT-21 (T13)        MaternaT-21 (T18)        MaternaT-21 T(T21)        VISIBILI T (T21)        VISIBILI T (T18)        QNatal T13        QNatal T18        QNatal T21        Cystic Fibrosis Screen [32]        Cystic Fibrosis Screen [165]        Cystic Fibrosis Screen [165]        Cystic Fibrosis Screen [165]        Cystic Fibrosis Screen [165]        RH d (Mingyian)        CVS        Nuchal Screening        NIPT [T13]        NIPT [T18]        NIPT [T21]        Carrier Screen        First Trimester Screen        Angela - NIPT [T13]        Angela - NIPT [T18]        Angela - NIPT [T21]        Angela - Carrier Screen                  Genetic Screening (GA 0-45w)       Test Value Reference Range Date Time    AFP Tetra-Patient's HCG        AFP Tetra-Mom for HCG        AFP Tetra-Patient's UE3        AFP Tetra-Mom for UE3        AFP Tetra-Patient's ADRIAN        AFP Tetra-Mom for ADRIAN        AFP Tetra-Patient's AFP        AFP Tetra-Mom for AFP        AFP. Spina Bifida        Quad Screen ([facility])        AFP        SMA                  Legend    ^: Historical                                  All of the findings and plan were discussed with the patient.  She notes understanding and agrees with the plan of care.  All questions were answered to the best of my ability at this time.      Total patient time was 15 minutes in  evaluation, consultation, and coordination of care. This included face to face and non-face to face actions. The patient's questions and concerns were addressed.       DO EVERT Lagos - OBGYN        Note to patient and family   The 21st Century Cures Act makes medical notes available to patients in the interest of transparency.  However, please be advised that this is a medical document.  It is intended as qbcb-ca-tnny communication.  It is written and medical language may contain abbreviations or verbiage that are technical and unfamiliar.  It may appear blunt or direct.  Medical documents are intended to carry relevant information, facts as evident, and the clinical opinion of the practitioner.      This note could include assistance by Dragon voice recognition. Errors in content may be related to improper recognition by the system; efforts to review and correct have been done but errors may still exist.         [1]    Biotin 1 MG Oral Cap Take by mouth.      prenatal vitamin with DHA 27-0.8-228 MG Oral Cap Take 1 capsule by mouth daily. 90 capsule 3   [2] No Known Allergies  [3]   Past Medical History:   Ulcerative colitis (HCC)   [4] History reviewed. No pertinent surgical history.

## 2025-06-26 LAB
C TRACH DNA SPEC QL NAA+PROBE: NEGATIVE
N GONORRHOEA DNA SPEC QL NAA+PROBE: NEGATIVE

## 2025-06-27 ENCOUNTER — PATIENT MESSAGE (OUTPATIENT)
Dept: OBGYN CLINIC | Facility: CLINIC | Age: 20
End: 2025-06-27

## 2025-06-27 ENCOUNTER — LABORATORY ENCOUNTER (OUTPATIENT)
Dept: LAB | Age: 20
End: 2025-06-27
Attending: STUDENT IN AN ORGANIZED HEALTH CARE EDUCATION/TRAINING PROGRAM
Payer: MEDICAID

## 2025-06-27 DIAGNOSIS — E74.39 GLUCOSE INTOLERANCE: ICD-10-CM

## 2025-06-27 PROBLEM — O99.891 BACTERIURIA DURING PREGNANCY IN FIRST TRIMESTER (HCC): Status: ACTIVE | Noted: 2025-06-27

## 2025-06-27 PROBLEM — R82.71 BACTERIURIA DURING PREGNANCY IN FIRST TRIMESTER (HCC): Status: ACTIVE | Noted: 2025-06-27

## 2025-06-27 LAB
GLUCOSE 1H P GLC SERPL-MCNC: 121 MG/DL (ref 70–179)
GLUCOSE 2H P GLC SERPL-MCNC: 127 MG/DL (ref 70–154)
GLUCOSE 3H P GLC SERPL-MCNC: 108 MG/DL (ref 70–140)
GLUCOSE P FAST SERPL-MCNC: 75 MG/DL (ref 70–94)

## 2025-06-27 PROCEDURE — 82952 GTT-ADDED SAMPLES: CPT

## 2025-06-27 PROCEDURE — 82951 GLUCOSE TOLERANCE TEST (GTT): CPT

## 2025-06-27 PROCEDURE — 36415 COLL VENOUS BLD VENIPUNCTURE: CPT

## 2025-07-02 LAB
HGB A2 MFR BLD: 2.4 % (ref 1.5–3.5)
HGB PNL BLD ELPH: 97.6 % (ref 95.5–100)

## 2025-07-10 ENCOUNTER — LAB ENCOUNTER (OUTPATIENT)
Dept: LAB | Age: 20
End: 2025-07-10
Attending: STUDENT IN AN ORGANIZED HEALTH CARE EDUCATION/TRAINING PROGRAM
Payer: MEDICAID

## 2025-07-10 ENCOUNTER — ROUTINE PRENATAL (OUTPATIENT)
Dept: OBGYN CLINIC | Facility: CLINIC | Age: 20
End: 2025-07-10
Payer: MEDICAID

## 2025-07-10 VITALS
HEIGHT: 59 IN | HEART RATE: 87 BPM | DIASTOLIC BLOOD PRESSURE: 64 MMHG | BODY MASS INDEX: 26.81 KG/M2 | SYSTOLIC BLOOD PRESSURE: 110 MMHG | WEIGHT: 133 LBS

## 2025-07-10 DIAGNOSIS — K51.919 ULCERATIVE COLITIS WITH COMPLICATION, UNSPECIFIED LOCATION (HCC): ICD-10-CM

## 2025-07-10 DIAGNOSIS — O99.019 ANTEPARTUM ANEMIA (HCC): ICD-10-CM

## 2025-07-10 DIAGNOSIS — Z34.00 PRENATAL CARE, FIRST PREGNANCY, ANTEPARTUM (HCC): Primary | ICD-10-CM

## 2025-07-10 DIAGNOSIS — O99.891: ICD-10-CM

## 2025-07-10 DIAGNOSIS — R82.71: ICD-10-CM

## 2025-07-10 LAB
BASOPHILS # BLD AUTO: 0.02 X10(3) UL (ref 0–0.2)
BASOPHILS NFR BLD AUTO: 0.3 %
DEPRECATED HBV CORE AB SER IA-ACNC: 7 NG/ML (ref 50–306)
EOSINOPHIL # BLD AUTO: 0.12 X10(3) UL (ref 0–0.7)
EOSINOPHIL NFR BLD AUTO: 1.6 %
ERYTHROCYTE [DISTWIDTH] IN BLOOD BY AUTOMATED COUNT: 14.8 %
HCT VFR BLD AUTO: 34.6 % (ref 35–48)
HGB BLD-MCNC: 11.4 G/DL (ref 12–16)
IMM GRANULOCYTES # BLD AUTO: 0.01 X10(3) UL (ref 0–1)
IMM GRANULOCYTES NFR BLD: 0.1 %
IRON SATN MFR SERPL: 7 % (ref 15–50)
IRON SERPL-MCNC: 33 UG/DL (ref 50–170)
LYMPHOCYTES # BLD AUTO: 3.32 X10(3) UL (ref 1.5–5)
LYMPHOCYTES NFR BLD AUTO: 44.9 %
MCH RBC QN AUTO: 27.1 PG (ref 26–34)
MCHC RBC AUTO-ENTMCNC: 32.9 G/DL (ref 31–37)
MCV RBC AUTO: 82.4 FL (ref 80–100)
MONOCYTES # BLD AUTO: 0.52 X10(3) UL (ref 0.1–1)
MONOCYTES NFR BLD AUTO: 7 %
NEUTROPHILS # BLD AUTO: 3.41 X10 (3) UL (ref 1.5–7.7)
NEUTROPHILS # BLD AUTO: 3.41 X10(3) UL (ref 1.5–7.7)
NEUTROPHILS NFR BLD AUTO: 46.1 %
PLATELET # BLD AUTO: 269 10(3)UL (ref 150–450)
RBC # BLD AUTO: 4.2 X10(6)UL (ref 3.8–5.3)
TOTAL IRON BINDING CAPACITY: 495 UG/DL (ref 250–425)
TRANSFERRIN SERPL-MCNC: 397 MG/DL (ref 250–380)
WBC # BLD AUTO: 7.4 X10(3) UL (ref 4–11)

## 2025-07-10 PROCEDURE — 83540 ASSAY OF IRON: CPT

## 2025-07-10 PROCEDURE — 87077 CULTURE AEROBIC IDENTIFY: CPT | Performed by: OBSTETRICS & GYNECOLOGY

## 2025-07-10 PROCEDURE — 82728 ASSAY OF FERRITIN: CPT

## 2025-07-10 PROCEDURE — 83550 IRON BINDING TEST: CPT

## 2025-07-10 PROCEDURE — 87086 URINE CULTURE/COLONY COUNT: CPT | Performed by: OBSTETRICS & GYNECOLOGY

## 2025-07-10 PROCEDURE — 36415 COLL VENOUS BLD VENIPUNCTURE: CPT

## 2025-07-10 PROCEDURE — 85025 COMPLETE CBC W/AUTO DIFF WBC: CPT

## 2025-07-10 PROCEDURE — 99213 OFFICE O/P EST LOW 20 MIN: CPT | Performed by: OBSTETRICS & GYNECOLOGY

## 2025-07-10 NOTE — PATIENT INSTRUCTIONS
Pediatrician/Family Practice Referral    Pediatricians:    [facility]  [address]  [place], [place]  [phone]    [facility]  [address]  [place], [place]   [phone]    [facility]  [address]  [phone]    [facility]  [address]  [place]  [phone]        Family Practice:    Dr. Rachel Last and Dr. Vipul Cutler  [address]  [place]  [phone]    Dr. Antoni Kelly and Dr. Michel Wang  [address]  [phone]    Dr. Karyn Goldstein  [address]  [place], [place]  [phone]    Dr. Radha Davila  [address] [place], [place]  [phone]    Dr. Isabel Cerrato  [address]  [place], [place]  [phone]    Dr. Elsie Hernadez, Dr. Reji Ramirez and Dr. Sandra Hicks  [address] [place] [address]  [phone]    Dr. Tiana Alejandra  [address]  [place], [place]  [phone]

## 2025-07-10 NOTE — PROGRESS NOTES
Chief Complaint   Patient presents with    Prenatal Care     Julio      Routine prenatal visit without complaints.  Patient denies any bleeding, leaking fluid, cramping, or regular uterine contractions. Good fetal movement.  Assessment/Plan:  34w1d doing well  GBS next  Anemia- had appt with hematology.  Needs to make appt for iron infusion ASAP  Kick counts reminded  Reviewed  labor signs and symptoms.  Reviewed vaccine recommendations:  Tdap done.  Diagnoses and all orders for this visit:    Prenatal care, first pregnancy, antepartum (formerly [facility])    Bacteriuria during pregnancy in first trimester (formerly [facility])  -     Urine Culture, Routine; Future       Return in about 2 weeks (around 2025) for Routine Prenatal Visit, GBBS.

## 2025-07-11 ENCOUNTER — TELEPHONE (OUTPATIENT)
Age: 20
End: 2025-07-11

## 2025-07-17 ENCOUNTER — OFFICE VISIT (OUTPATIENT)
Age: 20
End: 2025-07-17
Attending: INTERNAL MEDICINE
Payer: MEDICAID

## 2025-07-17 VITALS
TEMPERATURE: 98 F | RESPIRATION RATE: 16 BRPM | DIASTOLIC BLOOD PRESSURE: 57 MMHG | SYSTOLIC BLOOD PRESSURE: 103 MMHG | HEART RATE: 69 BPM | OXYGEN SATURATION: 99 %

## 2025-07-17 DIAGNOSIS — O99.019 ANTEPARTUM ANEMIA (HCC): Primary | ICD-10-CM

## 2025-07-17 NOTE — PROGRESS NOTES
Pt here for Venofer . Pt denies any issues or concerns.      Ordering Provider: dr barragan  Order Exp: ongoing     Pt tolerated infusion without difficulty or complaint. Reviewed next apt date/time: 7-24-25      Education Record  Learner:  Patient and Family Member  Disease / Diagnosis: VÍCTOR  Barriers / Limitations:  None  Method:  Brief focused  General Topics:  Plan of care reviewed  Outcome:  Shows understanding

## 2025-07-22 ENCOUNTER — PATIENT MESSAGE (OUTPATIENT)
Dept: OBGYN CLINIC | Facility: CLINIC | Age: 20
End: 2025-07-22

## 2025-07-23 ENCOUNTER — ULTRASOUND ENCOUNTER (OUTPATIENT)
Dept: PERINATAL CARE | Facility: HOSPITAL | Age: 20
End: 2025-07-23
Attending: OBSTETRICS & GYNECOLOGY
Payer: MEDICAID

## 2025-07-23 ENCOUNTER — ROUTINE PRENATAL (OUTPATIENT)
Dept: OBGYN CLINIC | Facility: CLINIC | Age: 20
End: 2025-07-23
Payer: MEDICAID

## 2025-07-23 VITALS
BODY MASS INDEX: 28.02 KG/M2 | HEART RATE: 90 BPM | HEIGHT: 59 IN | WEIGHT: 139 LBS | SYSTOLIC BLOOD PRESSURE: 112 MMHG | DIASTOLIC BLOOD PRESSURE: 71 MMHG

## 2025-07-23 VITALS
SYSTOLIC BLOOD PRESSURE: 116 MMHG | DIASTOLIC BLOOD PRESSURE: 68 MMHG | HEART RATE: 101 BPM | BODY MASS INDEX: 28 KG/M2 | WEIGHT: 138.81 LBS

## 2025-07-23 DIAGNOSIS — O09.893 HIGH RISK TEEN PREGNANCY IN THIRD TRIMESTER (HCC): ICD-10-CM

## 2025-07-23 DIAGNOSIS — Z34.00 SUPERVISION OF NORMAL FIRST PREGNANCY, ANTEPARTUM (HCC): Primary | ICD-10-CM

## 2025-07-23 DIAGNOSIS — K51.919 ULCERATIVE COLITIS WITH COMPLICATION, UNSPECIFIED LOCATION (HCC): Primary | ICD-10-CM

## 2025-07-23 DIAGNOSIS — K51.90 ULCERATIVE COLITIS (HCC): ICD-10-CM

## 2025-07-23 PROCEDURE — 87150 DNA/RNA AMPLIFIED PROBE: CPT | Performed by: NURSE PRACTITIONER

## 2025-07-23 PROCEDURE — 87081 CULTURE SCREEN ONLY: CPT | Performed by: NURSE PRACTITIONER

## 2025-07-23 PROCEDURE — 76816 OB US FOLLOW-UP PER FETUS: CPT | Performed by: OBSTETRICS & GYNECOLOGY

## 2025-07-23 PROCEDURE — 99213 OFFICE O/P EST LOW 20 MIN: CPT | Performed by: NURSE PRACTITIONER

## 2025-07-23 PROCEDURE — 76819 FETAL BIOPHYS PROFIL W/O NST: CPT

## 2025-07-23 NOTE — PROGRESS NOTES
Chief Complaint   Patient presents with    Prenatal Care     AIBGAIL      19 year old  at 36w0d who presents for routine OB visit without complaints. Doing well.   Patient denies any bleeding, leaking fluid, cramping, or contractions.  Assessment/Plan:   36w0d doing well.  Continue routine prenatal care  Kick counts reminded  Reviewed  labor signs and symptoms.    Diagnoses and all orders for this visit:    Supervision of normal first pregnancy, antepartum (HCC)  -     Strep B Culture; Future      [facility] for growth US today  Return in about 1 week (around 2025).     Subjective:   Review of Systems:  General: no complaints per category. See HPI for additional information.   Breast: no complaints per category. See HPI for additional information.   Respiratory: no complaints per category. See HPI for additional information.   Cardiovascular: no complaints per category. See HPI for additional information.   GI: no complaints per category. See HPI for additional information.   : no complaints per category. See HPI for additional information.   Heme: no complaints per category. See HPI for additional information.   Obstetrical History:   OB History    Para Term  AB Living   1        SAB IAB Ectopic Multiple Live Births             # Outcome Date GA Lbr Marty/2nd Weight Sex Type Anes PTL Lv   1 Current              Gynecological History: na  Medications:  Current Medications[1]   Allergies:  Allergies[2]  Past Medical History:  Past Medical History[3]  Past Surgical History:  Past Surgical History[4]  Immunizations:   Immunization History   Administered Date(s) Administered    DTAP 2005, 2006, 2006, 2006, 2006, 2006, 2009    DTAP-IPV 2009    DTAP/HEP B/IPV Combined 2005, 2006    FLULAVAL 6 months & older 0.5 ml Prefilled syringe (94741) 10/05/2017, 2021    FLUZONE 6 months and older PFS 0.5 ml (61269) 10/25/2014, 10/05/2017,  2021    HEP A 2006, 2008    HEP A,Ped/Adol,(2 Dose) 2006, 2008    HEP B 2005, 2005, 2006, 2006    HEP B, Ped/Adol 2005, 2006    HIB 2005, 2006, 2006, 2011    HIB 3 Dose Schedule 2011    Hib, Unspecified Formulation 2005, 2006, 2006    Hpv Virus Vaccine 9 Aydee Im 2021, 10/12/2023    IPV 2005, 2006, 2006, 2009    Influenza 2006, 2007, 2008, 2008, 2009    Influenza Vaccine, trivalent (IIV3), PF 0.5mL (41169) 2025    MMR 2006, 2009    Meningococcal Vaccine 10/05/2017    Meningococcal-Menactra 10/05/2017    Meningococcal-Menveo 10-55 YEARS 10/12/2023    Pneumococcal (Prevnar 13) 2005, 2006, 2006, 2006, 2011    Pneumococcal (Prevnar 7) 2005, 2006, 2006, 2006    TDAP 10/05/2017, 2025    Varicella 2006, 2009   Pended Date(s) Pended    Hpv Virus Vaccine 9 Aydee Im 2021     Objective:     Vitals:    25 1438   BP: 116/68   Pulse: 101   Weight: 138 lb 12.8 oz (63 kg)     Body mass index is 28.03 kg/m².  Physical Examination:  General appearance: Well dressed, well nourished in no apparent distress  Neurologic/Psychiatric: Alert and oriented to person, place and time, mood normal, affect appropriate  Abdomen: Soft, non-tender, non-distended-   Skin: General inspection- no rashes, lesions or discoloration  Patient Active Problem List    Diagnosis    Bacteriuria during pregnancy in first trimester ([facility])     [x ] repeat urine cx- 10-50k staph      Abnormal maternal glucose tolerance, antepartum ([facility])     [ X ] 3hr GTT -> has not completed to date at 32 week appt, will go to lab today to schedule while getting 1T OB panel done- normal      Antepartum anemia (HCC)     Referral to hematology       Supervision of normal first pregnancy, antepartum ([facility])     [  x] initial OB panel -> pt has not completed to date, reminded at 32 week appt, states will complete today  [/] genetic testing  [x] Level II scan      Ulcerative colitis (HCC)     Taking Infiximab (remicade), mesalamine and prednisone, continuing care with [facility].   [x] [facility]erral to [facility]    Monthly growth US in third trimester with BPP at or beyond 32 weeks: normal growth a 28 weeks  Continue medications for ulcerative colitis and co-management with GI      Psoriasis of scalp       Total patient time was 25 minutes in reviewing paper/electronic records, reviewing test results from outside care provider, obtaining history, evaluating the patient, consultation, discussing treatment options, coordination of care and completing documentation. This included face to face and non-face to face actions. The patient's questions and concerns were addressed.          [1]    mesalamine 1.2 g Oral Tab EC Take 4 tablets (4.8 g total) by mouth daily with breakfast.      Biotin 1 MG Oral Cap Take by mouth.      Mesalamine 4 g Rectal Enema       fluticasone propionate 50 MCG/ACT Nasal Suspension 1 spray by Nasal route daily.      albuterol 108 (90 Base) MCG/ACT Inhalation Aero Soln Inhale 2 puffs into the lungs.      prenatal vitamin with DHA 27-0.8-228 MG Oral Cap Take 1 capsule by mouth daily. 90 capsule 3    Mesalamine  MG Oral Cap CR Take 4 capsules (1,000 mg total) by mouth daily.      famotidine 20 MG Oral Tab Take 1 tablet (20 mg total) by mouth in the morning and 1 tablet (20 mg total) before bedtime.     [2] No Known Allergies  [3]   Past Medical History:   Ulcerative colitis (HCC)   [4] History reviewed. No pertinent surgical history.

## 2025-07-23 NOTE — PROGRESS NOTES
Outpatient Maternal-Fetal Medicine Consultation    Dear Dr. Rollins    Thank you for requesting ultrasound evaluation and maternal fetal medicine consultation on your patient Leah Craig.  As you are aware she is a 19 year old female  with a bello pregnancy and an Estimated Date of Delivery: 25.  A maternal-fetal medicine f/u is today. Her prenatal records and labs were reviewed.    No symptoms currently of ulcerative colitis.  Feeling overall well.  Has upper abdominal discomfort on both sides of upper abdomen when she leans forward.  Now only on remicade infusions    She is 4 foot 11 in tall.  Father of the baby was 6 pounds when he was born.  ROS    HISTORY  OB History    Para Term  AB Living   1        SAB IAB Ectopic Multiple Live Births             # Outcome Date GA Lbr Marty/2nd Weight Sex Type Anes PTL Lv   1 Current                Allergies:  Allergies[1]   Current Meds:  Current Outpatient Medications   Medication Sig Dispense Refill    Biotin 1 MG Oral Cap Take by mouth.      prenatal vitamin with DHA 27-0.8-228 MG Oral Cap Take 1 capsule by mouth daily. 90 capsule 3    mesalamine 1.2 g Oral Tab EC Take 4 tablets (4.8 g total) by mouth daily with breakfast. (Patient not taking: Reported on 2025)      Mesalamine 4 g Rectal Enema  (Patient not taking: Reported on 2025)      fluticasone propionate 50 MCG/ACT Nasal Suspension 1 spray by Nasal route daily. (Patient not taking: Reported on 2025)      albuterol 108 (90 Base) MCG/ACT Inhalation Aero Soln Inhale 2 puffs into the lungs. (Patient not taking: Reported on 2025)      predniSONE 10 MG Oral Tab Take 1 tablet (10 mg total) by mouth in the morning. (Patient not taking: Reported on 2025)      Mesalamine  MG Oral Cap CR Take 4 capsules (1,000 mg total) by mouth daily. (Patient not taking: Reported on 2025)      famotidine 20 MG Oral Tab Take 1 tablet (20 mg total) by mouth in the  morning and 1 tablet (20 mg total) before bedtime. (Patient not taking: Reported on 6/25/2025)      Ferrous Sulfate 325 (65 Fe) MG Oral Tab Take 1 tablet (325 mg total) by mouth in the morning. (Patient not taking: Reported on 6/25/2025)          HISTORY:  Past Medical History:    Ulcerative colitis (HCC)      No past surgical history on file.   No family history on file.   Social History     Socioeconomic History    Marital status: Single   Tobacco Use    Smoking status: Never    Smokeless tobacco: Never   Vaping Use    Vaping status: Never Used   Substance and Sexual Activity    Alcohol use: No    Drug use: Yes     Types: Cannabis     Comment: occ     Social Drivers of Health     Food Insecurity: Low Risk  (3/25/2025)    Received from [facility]    Food Insecurity     Have there been times that your food ran out, and you didn't have money to get more?: No     Are there times that you worry that this might happen?: No   Transportation Needs: Low Risk  (3/25/2025)    Received from [facility]    Transportation Needs     Do you have trouble getting transportation to medical appointments?: No   Housing Stability: Low Risk  (3/25/2025)    Received from [facility]    Housing Stability     Are you worried that your electric, gas, oil, or water might be shut off?: No     Are you concerned about having a safe and reliable place to live?: No          PHYSICAL EXAMINATION:  /71 (BP Location: Right arm, Patient Position: Sitting, Cuff Size: adult)   Pulse 90   Ht 4' 11\" (1.499 m)   Wt 139 lb (63 kg)   LMP  (LMP Unknown)   BMI 28.07 kg/m²   Physical Exam  Constitutional:       Appearance: Normal appearance.   Abdominal:      Palpations: Abdomen is soft.      Tenderness: There is no abdominal tenderness.   Neurological:      Mental Status: She is alert.   Psychiatric:         Mood and Affect: Mood normal.         Behavior: Behavior normal.            OBSTETRIC ULTRASOUND  The patient had a follow-up growth and BPP ultrasound today which revealed normal interval fetal growth and a BPP of 8/8.   Ultrasound Findings:  Single IUP in cephalic presentation.    Placenta is posterior.   A 3 vessel cord is noted. Normal cord insertion.  Cardiac activity is present at 137 bpm  EFW 2577 g ( 5 lb 11 oz); 29%.    BARRETT is  13.8 cm.  MVP is 5.8 cm  BPP is 8/8.     The fetal measurements are consistent with established MANDIE. No gross ultrasound evidence of structural abnormalities are seen today. The patient understands that ultrasound cannot rule out all structural and chromosomal abnormalities.   See PACS/Imaging Tab For Complete Ultrasound Report  I interpreted the results and reviewed them with the patient.    DISCUSSION  During her visit we discussed and reviewed the following issues:  INFLAMMATORY BOWEL DISEASE IN PREGNANCY     She was diagnosed in late November/December of  with ulcerative colitis.  She was recently discharged from an admission for her ulcerative colitis.  She received her first dose of remicade and is on mesalamine along with  prednisone.  While she feels some better than she did in the hospital, she continues to have frequent bowel movements.  This is a desired pregnancy.     Please see previous [facility] detailed discussion.      NUTRITION  Consultation with a nutritionist should be considered to offer advice on eating a well-balanced, healthy diet.  Folate supplementation (2 mg daily) should be recommended in patients with IBD on low residue diets, with ileal involvement, and patients on medications that interfere with folic acid metabolism (eg, sulfasalazine).  Iron and B12 levels should therefore be checked in the first trimester and supplementation should be provided as needed     MODE OF DELIVERY  Patients with active perineal disease or rectal involvement with Crohn disease should undergo  delivery in order to avoid perineal trauma  from vaginal birth that can trigger or worsen existing perineal disease. The mode of delivery in all other patients with inflammatory bowel disease should be dictated by obstetric necessity. In patients with an ileoanal anastomosis or J pouch, many advocate planned  delivery as well, though data suggest no long-term worsening of pouch function.\Vaginal delivery can be attempted in women with a colostomy, ileostomy, or continent ileostomy and has not been associated with an increased risk of ostomy complications.    Mesalamine - Adverse events have not been observed in animal reproduction studies. Mesalamine is known to cross the placenta. An increased rate of congenital malformations has not been observed in human studies.  birth, still birth and decreased birth weight have been observed; however, these events may also be due to maternal disease.    When treatment for inflammatory bowel disease is needed during pregnancy, mesalamine may be used, although products with DBP should be avoided (Tamiko 2012; Dennis 2009).      Prednisone and prednisolone cross the human placenta. Some studies have shown an association between first trimester prednisone use and oral clefts; adverse events in the fetus/ have been noted in case reports following large doses of systemic corticosteroids during pregnancy.  It is compatible with breast feeding.       Imaging:      CT ABDOMEN PELVIS W CONTRAST 2024  IMPRESSION:   1.  Circumferential colonic wall thickening beginning in the distal transverse colon and extending through the mid to distal sigmoid colon. Subcentimeter pericolonic lymph nodes and prominence of the vasa recta. Findings most consistent with nonspecific colitis. Follow-up endoscopy recommended.  2.   Thick walled peripherally enhancing and collapsing left ovarian cyst. Mild free fluid in the dependent pelvis.  3.  See other findings above.       25   PATIENT NAME: Leah Craig    MRN:[id number]     DATE OF ADMISSION: 1/15/2025  DATE OF DISCHARGE: 1/21/25  DISCHARGE LOCATION: home   CONDITION AT DISCHARGE: good   DISCHARGING PHYSICIAN: Maninder Calzada DO     TEST RESULTS PENDING UPON DISCHARGE: None     REASON FOR HOSPITALIZATION:   Colitis [K52.9]  Anemia [D64.9]  GI bleed [K92.2]  Abdominal pain [R10.9]  First trimester pregnancy [Z34.91]     DISCHARGE DIAGNOSIS:  Hematochezia     HOSPITAL COURSE:   Newly diagnosed ulcerative colitis  Acute on chronic blood loss & Fe def anemia  - GI consulted, s/p flex sig w/biopsies 1/16/25 (path confirmed UC)  - Pred 40x7d, 30x7d, 20x7d, 10x7d, 5x7d  - Cont PO mesalamine indefinitely, rectal mesalamine QHS for 1 month  - PO pepcid BID for GI ppx  - PO iron   - PRN tylenol/norco  - F/u with Dr. Fink to arrange future remicade infusion (s/p 1st dose 1/18/25)    Infliximab (Remicade) is an immunosuppressant agent (monoclonal antibody and TNF blocking agent) used to treat a number of conditions including ankylosing spondylitis, crohn's disease, ulcerative colitis, rheumatoid arthritis, psoriasis and psoriatic arthritis.  It is considered a category B medication in human pregnancy.      There have not been animal studies performed.  Infliximab crosses the placenta and can be detected in serum of infant for up to 6 months following in utero exposure.  Therefore, it is recommended to wait >6 months before administering any live vaccine to infants exposed to infliximab in utero.  When biologic agents such as infliximab are needed during pregnancy, it is recommended to hold therapy after 30 weeks gestation.    Healthcare providers are encouraged to enroll women on infliximab during pregnanc in the [facility] Autoimmune Disease Study by contacting the [employer] ([employer]) [phone].    Signs and symptoms of preeclampsia were reviewed.    GLUCOSE 1HR OB   Date Value Ref Range Status   04/30/2025 141 (H) See comment mg/dL  Final     Comment:     If the plasma glucose level measured after 1 hour is >=130, 135 or 140 mg/dl proceed to \"Glucose Tolerance, 100 gm (0 hr, 1 hr, 2hr, 3hr), Gestational (ADA)\" test on a separate day, as clinically indicated.         Lab Results   Component Value Date    GLUFG 75 06/27/2025    GLU1G 121 06/27/2025    GLU2G 127 06/27/2025    GLU3G 108 06/27/2025     Signs and symptoms of preeclampsia were reviewed.      IMPRESSION:  IUP at 36w0d   Newly diagnosed Ulcerative colitis  Appropriate growth, BPP 8/8      RECOMMENDATIONS:  Continue care with Dr. Rollins  Continue medications for ulcerative colitis and co-management with GI   Recommend at least 18 to 24 months between pregnancies.        Thank you for allowing me to participate in the care of your patient.  Please do not hesitate to contact me if additional questions or concerns arise.      Pauline Paredes M.D.    20 minutes spent in review of records, patient consultation, documentation and coordination of care.  The relevant clinical matter(s) are summarized above.     Note to patient and family  The 21st Century Cures Act makes medical notes available to patients in the interest of transparency.  However, please be advised that this is a medical document.  It is intended as [id number] communication.  It is written and medical language may contain abbreviations or verbiage that are technical and unfamiliar.  It may appear blunt or direct.  Medical documents are intended to carry relevant information, facts as evident, and the clinical opinion of the practitioner.           [1] No Known Allergies

## 2025-07-24 ENCOUNTER — OFFICE VISIT (OUTPATIENT)
Facility: LOCATION | Age: 20
End: 2025-07-24
Attending: INTERNAL MEDICINE
Payer: MEDICAID

## 2025-07-24 VITALS
HEART RATE: 108 BPM | RESPIRATION RATE: 16 BRPM | WEIGHT: 139.81 LBS | DIASTOLIC BLOOD PRESSURE: 63 MMHG | SYSTOLIC BLOOD PRESSURE: 101 MMHG | TEMPERATURE: 98 F | BODY MASS INDEX: 28.19 KG/M2 | HEIGHT: 59.02 IN | OXYGEN SATURATION: 99 %

## 2025-07-24 DIAGNOSIS — O99.019 ANTEPARTUM ANEMIA (HCC): Primary | ICD-10-CM

## 2025-07-24 NOTE — PROGRESS NOTES
Education Record    Learner:  Patient and Family Member    Disease / Diagnosis: anemia in pregnancy    Barriers / Limitations:  None   Comments:    Method:  Discussion   Comments:    General Topics:  Medication, Side effects and symptom management, Plan of care reviewed, and Fall risk and prevention   Comments:    Outcome:  Shows understanding   Comments:    Venofer iron infusion tolerated well. Pt discharged ambulatory in stable condition.

## 2025-07-25 LAB — GROUP B STREP BY PCR FOR PCR OVT: POSITIVE

## 2025-07-30 ENCOUNTER — ROUTINE PRENATAL (OUTPATIENT)
Dept: OBGYN CLINIC | Facility: CLINIC | Age: 20
End: 2025-07-30
Payer: MEDICAID

## 2025-07-30 VITALS
WEIGHT: 143 LBS | BODY MASS INDEX: 28.83 KG/M2 | SYSTOLIC BLOOD PRESSURE: 118 MMHG | HEIGHT: 59 IN | DIASTOLIC BLOOD PRESSURE: 60 MMHG | HEART RATE: 97 BPM

## 2025-07-30 DIAGNOSIS — Z34.93 THIRD TRIMESTER PREGNANCY (HCC): Primary | ICD-10-CM

## 2025-07-30 PROCEDURE — 99213 OFFICE O/P EST LOW 20 MIN: CPT | Performed by: OBSTETRICS & GYNECOLOGY

## 2025-07-31 ENCOUNTER — TELEPHONE (OUTPATIENT)
Dept: OBGYN CLINIC | Facility: CLINIC | Age: 20
End: 2025-07-31

## 2025-07-31 ENCOUNTER — OFFICE VISIT (OUTPATIENT)
Facility: LOCATION | Age: 20
End: 2025-07-31
Attending: INTERNAL MEDICINE
Payer: MEDICAID

## 2025-07-31 VITALS
HEART RATE: 98 BPM | WEIGHT: 141.63 LBS | DIASTOLIC BLOOD PRESSURE: 71 MMHG | HEIGHT: 59.02 IN | BODY MASS INDEX: 28.55 KG/M2 | RESPIRATION RATE: 16 BRPM | OXYGEN SATURATION: 97 % | TEMPERATURE: 98 F | SYSTOLIC BLOOD PRESSURE: 107 MMHG

## 2025-07-31 DIAGNOSIS — O99.019 ANTEPARTUM ANEMIA (HCC): Primary | ICD-10-CM

## 2025-08-04 ENCOUNTER — ANESTHESIA (OUTPATIENT)
Dept: OBGYN UNIT | Facility: HOSPITAL | Age: 20
End: 2025-08-04

## 2025-08-04 ENCOUNTER — ANESTHESIA EVENT (OUTPATIENT)
Dept: OBGYN UNIT | Facility: HOSPITAL | Age: 20
End: 2025-08-04

## 2025-08-04 ENCOUNTER — HOSPITAL ENCOUNTER (INPATIENT)
Facility: HOSPITAL | Age: 20
LOS: 2 days | Discharge: HOME | End: 2025-08-06
Attending: STUDENT IN AN ORGANIZED HEALTH CARE EDUCATION/TRAINING PROGRAM | Admitting: STUDENT IN AN ORGANIZED HEALTH CARE EDUCATION/TRAINING PROGRAM

## 2025-08-04 PROBLEM — Z34.90 PREGNANCY (HCC): Status: ACTIVE | Noted: 2025-08-04

## 2025-08-04 LAB
ANTIBODY SCREEN: NEGATIVE
BASOPHILS # BLD AUTO: 0.02 X10(3) UL (ref 0–0.2)
BASOPHILS NFR BLD AUTO: 0.4 %
EOSINOPHIL # BLD AUTO: 0.1 X10(3) UL (ref 0–0.7)
EOSINOPHIL NFR BLD AUTO: 1.8 %
ERYTHROCYTE [DISTWIDTH] IN BLOOD BY AUTOMATED COUNT: 16.7 %
HCT VFR BLD AUTO: 33.9 % (ref 35–48)
HGB BLD-MCNC: 11.5 G/DL (ref 12–16)
IMM GRANULOCYTES # BLD AUTO: 0.01 X10(3) UL (ref 0–1)
IMM GRANULOCYTES NFR BLD: 0.2 %
LYMPHOCYTES # BLD AUTO: 2.53 X10(3) UL (ref 1.5–5)
LYMPHOCYTES NFR BLD AUTO: 45.1 %
MCH RBC QN AUTO: 27.4 PG (ref 26–34)
MCHC RBC AUTO-ENTMCNC: 33.9 G/DL (ref 31–37)
MCV RBC AUTO: 80.9 FL (ref 80–100)
MONOCYTES # BLD AUTO: 0.45 X10(3) UL (ref 0.1–1)
MONOCYTES NFR BLD AUTO: 8 %
NEUTROPHILS # BLD AUTO: 2.5 X10 (3) UL (ref 1.5–7.7)
NEUTROPHILS # BLD AUTO: 2.5 X10(3) UL (ref 1.5–7.7)
NEUTROPHILS NFR BLD AUTO: 44.5 %
PLATELET # BLD AUTO: 247 10(3)UL (ref 150–450)
RBC # BLD AUTO: 4.19 X10(6)UL (ref 3.8–5.3)
RH BLOOD TYPE: POSITIVE
T PALLIDUM AB SER QL IA: NONREACTIVE
WBC # BLD AUTO: 5.6 X10(3) UL (ref 4–11)

## 2025-08-04 RX ORDER — ACETAMINOPHEN 500 MG
500 TABLET ORAL EVERY 6 HOURS PRN
Status: DISCONTINUED | OUTPATIENT
Start: 2025-08-04 | End: 2025-08-05

## 2025-08-04 RX ORDER — ACETAMINOPHEN 500 MG
1000 TABLET ORAL EVERY 6 HOURS PRN
Status: DISCONTINUED | OUTPATIENT
Start: 2025-08-04 | End: 2025-08-05

## 2025-08-04 RX ORDER — LIDOCAINE HYDROCHLORIDE 10 MG/ML
INJECTION, SOLUTION EPIDURAL; INFILTRATION; INTRACAUDAL; PERINEURAL AS NEEDED
Status: DISCONTINUED | OUTPATIENT
Start: 2025-08-04 | End: 2025-08-05 | Stop reason: SURG

## 2025-08-04 RX ORDER — NALBUPHINE HYDROCHLORIDE 10 MG/ML
2.5 INJECTION INTRAMUSCULAR; INTRAVENOUS; SUBCUTANEOUS
Status: DISCONTINUED | OUTPATIENT
Start: 2025-08-04 | End: 2025-08-05

## 2025-08-04 RX ORDER — BUPIVACAINE HYDROCHLORIDE 2.5 MG/ML
30 INJECTION, SOLUTION EPIDURAL; INFILTRATION; INTRACAUDAL; PERINEURAL AS NEEDED
Status: DISCONTINUED | OUTPATIENT
Start: 2025-08-04 | End: 2025-08-05

## 2025-08-04 RX ORDER — LIDOCAINE HYDROCHLORIDE 20 MG/ML
5 INJECTION, SOLUTION EPIDURAL; INFILTRATION; INTRACAUDAL; PERINEURAL AS NEEDED
Status: DISCONTINUED | OUTPATIENT
Start: 2025-08-04 | End: 2025-08-05

## 2025-08-04 RX ORDER — HYDROMORPHONE HYDROCHLORIDE 1 MG/ML
1 INJECTION, SOLUTION INTRAMUSCULAR; INTRAVENOUS; SUBCUTANEOUS EVERY 2 HOUR PRN
Refills: 0 | Status: DISCONTINUED | OUTPATIENT
Start: 2025-08-04 | End: 2025-08-05

## 2025-08-04 RX ORDER — SODIUM CHLORIDE, SODIUM LACTATE, POTASSIUM CHLORIDE, CALCIUM CHLORIDE 600; 310; 30; 20 MG/100ML; MG/100ML; MG/100ML; MG/100ML
INJECTION, SOLUTION INTRAVENOUS CONTINUOUS
Status: DISCONTINUED | OUTPATIENT
Start: 2025-08-04 | End: 2025-08-05 | Stop reason: HOSPADM

## 2025-08-04 RX ORDER — LIDOCAINE HYDROCHLORIDE AND EPINEPHRINE 15; 5 MG/ML; UG/ML
5 INJECTION, SOLUTION EPIDURAL AS NEEDED
Status: DISCONTINUED | OUTPATIENT
Start: 2025-08-04 | End: 2025-08-05

## 2025-08-04 RX ORDER — SODIUM CHLORIDE 9 MG/ML
10 INJECTION, SOLUTION INTRAMUSCULAR; INTRAVENOUS; SUBCUTANEOUS AS NEEDED
Status: DISCONTINUED | OUTPATIENT
Start: 2025-08-04 | End: 2025-08-05

## 2025-08-04 RX ORDER — DEXTROSE, SODIUM CHLORIDE, SODIUM LACTATE, POTASSIUM CHLORIDE, AND CALCIUM CHLORIDE 5; .6; .31; .03; .02 G/100ML; G/100ML; G/100ML; G/100ML; G/100ML
INJECTION, SOLUTION INTRAVENOUS AS NEEDED
Status: DISCONTINUED | OUTPATIENT
Start: 2025-08-04 | End: 2025-08-05 | Stop reason: HOSPADM

## 2025-08-04 RX ORDER — BUPIVACAINE HCL/0.9 % NACL/PF 0.25 %
5 PLASTIC BAG, INJECTION (ML) EPIDURAL AS NEEDED
Status: DISCONTINUED | OUTPATIENT
Start: 2025-08-04 | End: 2025-08-05

## 2025-08-04 RX ORDER — LIDOCAINE HYDROCHLORIDE AND EPINEPHRINE 15; 5 MG/ML; UG/ML
INJECTION, SOLUTION EPIDURAL AS NEEDED
Status: DISCONTINUED | OUTPATIENT
Start: 2025-08-04 | End: 2025-08-05 | Stop reason: SURG

## 2025-08-04 RX ORDER — IBUPROFEN 600 MG/1
600 TABLET, FILM COATED ORAL ONCE AS NEEDED
Status: COMPLETED | OUTPATIENT
Start: 2025-08-04 | End: 2025-08-05

## 2025-08-04 RX ORDER — ONDANSETRON 2 MG/ML
4 INJECTION INTRAMUSCULAR; INTRAVENOUS EVERY 6 HOURS PRN
Status: DISCONTINUED | OUTPATIENT
Start: 2025-08-04 | End: 2025-08-05 | Stop reason: HOSPADM

## 2025-08-04 RX ORDER — TERBUTALINE SULFATE 1 MG/ML
0.25 INJECTION SUBCUTANEOUS AS NEEDED
Status: DISCONTINUED | OUTPATIENT
Start: 2025-08-04 | End: 2025-08-05 | Stop reason: HOSPADM

## 2025-08-04 RX ORDER — ROPIVACAINE HYDROCHLORIDE 5 MG/ML
30 INJECTION, SOLUTION EPIDURAL; INFILTRATION; PERINEURAL AS NEEDED
Status: DISCONTINUED | OUTPATIENT
Start: 2025-08-04 | End: 2025-08-05 | Stop reason: HOSPADM

## 2025-08-04 RX ORDER — CITRIC ACID/SODIUM CITRATE 334-500MG
30 SOLUTION, ORAL ORAL AS NEEDED
Status: DISCONTINUED | OUTPATIENT
Start: 2025-08-04 | End: 2025-08-05 | Stop reason: HOSPADM

## 2025-08-04 RX ADMIN — LIDOCAINE HYDROCHLORIDE 3 ML: 10 INJECTION, SOLUTION EPIDURAL; INFILTRATION; INTRACAUDAL; PERINEURAL at 15:15:00

## 2025-08-04 RX ADMIN — LIDOCAINE HYDROCHLORIDE AND EPINEPHRINE 3 ML: 15; 5 INJECTION, SOLUTION EPIDURAL at 15:19:00

## 2025-08-05 PROCEDURE — 0HQ9XZZ REPAIR PERINEUM SKIN, EXTERNAL APPROACH: ICD-10-PCS | Performed by: STUDENT IN AN ORGANIZED HEALTH CARE EDUCATION/TRAINING PROGRAM

## 2025-08-05 PROCEDURE — 59409 OBSTETRICAL CARE: CPT | Performed by: STUDENT IN AN ORGANIZED HEALTH CARE EDUCATION/TRAINING PROGRAM

## 2025-08-05 RX ORDER — CHOLECALCIFEROL (VITAMIN D3) 25 MCG
1 TABLET,CHEWABLE ORAL DAILY
Status: DISCONTINUED | OUTPATIENT
Start: 2025-08-05 | End: 2025-08-06

## 2025-08-05 RX ORDER — IBUPROFEN 600 MG/1
600 TABLET, FILM COATED ORAL EVERY 6 HOURS
Status: DISCONTINUED | OUTPATIENT
Start: 2025-08-05 | End: 2025-08-06

## 2025-08-05 RX ORDER — AMMONIA 15 % (W/V)
0.3 AMPUL (EA) INHALATION AS NEEDED
Status: DISCONTINUED | OUTPATIENT
Start: 2025-08-05 | End: 2025-08-06

## 2025-08-05 RX ORDER — ACETAMINOPHEN 500 MG
500 TABLET ORAL EVERY 6 HOURS PRN
Status: DISCONTINUED | OUTPATIENT
Start: 2025-08-05 | End: 2025-08-06

## 2025-08-05 RX ORDER — DOCUSATE SODIUM 100 MG/1
100 CAPSULE, LIQUID FILLED ORAL
Status: DISCONTINUED | OUTPATIENT
Start: 2025-08-05 | End: 2025-08-06

## 2025-08-05 RX ORDER — BISACODYL 10 MG
10 SUPPOSITORY, RECTAL RECTAL ONCE AS NEEDED
Status: DISCONTINUED | OUTPATIENT
Start: 2025-08-05 | End: 2025-08-06

## 2025-08-05 RX ORDER — ACETAMINOPHEN 500 MG
1000 TABLET ORAL EVERY 6 HOURS PRN
Status: DISCONTINUED | OUTPATIENT
Start: 2025-08-05 | End: 2025-08-06

## 2025-08-05 RX ORDER — SIMETHICONE 80 MG
80 TABLET,CHEWABLE ORAL 3 TIMES DAILY PRN
Status: DISCONTINUED | OUTPATIENT
Start: 2025-08-05 | End: 2025-08-06

## 2025-08-06 VITALS
RESPIRATION RATE: 16 BRPM | TEMPERATURE: 98 F | BODY MASS INDEX: 28.83 KG/M2 | HEIGHT: 59 IN | WEIGHT: 143 LBS | SYSTOLIC BLOOD PRESSURE: 118 MMHG | OXYGEN SATURATION: 100 % | DIASTOLIC BLOOD PRESSURE: 68 MMHG | HEART RATE: 75 BPM

## 2025-08-06 LAB
BASOPHILS # BLD AUTO: 0.03 X10(3) UL (ref 0–0.2)
BASOPHILS NFR BLD AUTO: 0.3 %
EOSINOPHIL # BLD AUTO: 0.26 X10(3) UL (ref 0–0.7)
EOSINOPHIL NFR BLD AUTO: 2.6 %
ERYTHROCYTE [DISTWIDTH] IN BLOOD BY AUTOMATED COUNT: 17.2 %
HCT VFR BLD AUTO: 29.2 % (ref 35–48)
HGB BLD-MCNC: 9.8 G/DL (ref 12–16)
IMM GRANULOCYTES # BLD AUTO: 0.03 X10(3) UL (ref 0–1)
IMM GRANULOCYTES NFR BLD: 0.3 %
LYMPHOCYTES # BLD AUTO: 3.39 X10(3) UL (ref 1.5–5)
LYMPHOCYTES NFR BLD AUTO: 33.3 %
MCH RBC QN AUTO: 28.1 PG (ref 26–34)
MCHC RBC AUTO-ENTMCNC: 33.6 G/DL (ref 31–37)
MCV RBC AUTO: 83.7 FL (ref 80–100)
MONOCYTES # BLD AUTO: 0.76 X10(3) UL (ref 0.1–1)
MONOCYTES NFR BLD AUTO: 7.5 %
NEUTROPHILS # BLD AUTO: 5.72 X10 (3) UL (ref 1.5–7.7)
NEUTROPHILS # BLD AUTO: 5.72 X10(3) UL (ref 1.5–7.7)
NEUTROPHILS NFR BLD AUTO: 56 %
PLATELET # BLD AUTO: 200 10(3)UL (ref 150–450)
RBC # BLD AUTO: 3.49 X10(6)UL (ref 3.8–5.3)
WBC # BLD AUTO: 10.2 X10(3) UL (ref 4–11)

## 2025-08-06 RX ORDER — IBUPROFEN 600 MG/1
600 TABLET, FILM COATED ORAL EVERY 6 HOURS
Qty: 30 TABLET | Refills: 0 | Status: SHIPPED | OUTPATIENT
Start: 2025-08-06

## 2025-08-07 ENCOUNTER — TELEPHONE (OUTPATIENT)
Dept: OBGYN CLINIC | Facility: CLINIC | Age: 20
End: 2025-08-07

## 2025-08-07 ENCOUNTER — PATIENT MESSAGE (OUTPATIENT)
Dept: OBGYN CLINIC | Facility: CLINIC | Age: 20
End: 2025-08-07

## 2025-08-08 ENCOUNTER — TELEPHONE (OUTPATIENT)
Dept: OBGYN UNIT | Facility: HOSPITAL | Age: 20
End: 2025-08-08

## (undated) NOTE — LETTER
2025      Nisha Rollins, DO  [address] [address]  Via In Basket  Patient: Leah Craig  : 2005    Dear Colleague:  Thank you for referring your patient to me for a Maternal Fetal Medicine evaluation. Please see my attached note for my findings and recommendations.      Should you have any questions or concerns, please do not hesitate to contact me at the number listed below.    Best Regards,      Pauline Paredes MD  [facility]  [address] [facility] [address]  [place] [address]  [phone]    cc: No Recipients      [facility] Department of Maternal Fetal Medicine  Patient Name: Leah Craig  Patient : 2005  Physician: Pauline Paredes MD    Outpatient Maternal-Fetal Medicine Consultation    Dear Dr. Rollins    Thank you for requesting ultrasound evaluation and maternal fetal medicine consultation on your patient Leah Craig.  As you are aware she is a 19 year old female  with a bello pregnancy and an Estimated Date of Delivery: 25.  A maternal-fetal medicine consultation was requested secondary to ulcerative colitis.  Her prenatal records and labs were reviewed.    She was diagnosed in late November/December of  with ulcerative colitis.  She was recently discharged from an admission for her ulcerative colitis.  She received her first dose of remicade and is on mesalamine along with  prednisone.  While she feels some better than she did in the hospital, she continues to have frequent bowel movements.  This is a desired pregnancy.  ROS    HISTORY  OB History    Para Term  AB Living   1             SAB IAB Ectopic Multiple Live Births                  # Outcome Date GA Lbr Marty/2nd Weight Sex Type Anes PTL Lv   1 Current                Allergies:  Allergies[1]   Current Meds:  Current Outpatient Medications   Medication Sig Dispense Refill    Mesalamine 4 g Rectal Enema INSERT 60 ML  RECTALLY AT NIGHT      albuterol 108 (90 Base) MCG/ACT Inhalation Aero Soln Inhale 2 puffs into the lungs.      prenatal vitamin with DHA 27-0.8-228 MG Oral Cap Take 1 capsule by mouth daily. 90 capsule 3    predniSONE 10 MG Oral Tab Take 1 tablet (10 mg total) by mouth daily.      Mesalamine  MG Oral Cap CR Take 1 capsule (250 mg total) by mouth 4 (four) times daily.      famotidine 20 MG Oral Tab Take 1 tablet (20 mg total) by mouth 2 (two) times daily.      Ferrous Sulfate 325 (65 Fe) MG Oral Tab Take 1 tablet (325 mg total) by mouth daily.      Prenatal 27-0.8 MG Oral Tab Take 1 tablet by mouth daily.      fluticasone propionate 50 MCG/ACT Nasal Suspension 1 spray by Nasal route daily. (Patient not taking: Reported on 3/4/2025)          HISTORY:  Past Medical History:    Ulcerative colitis (HCC)      No past surgical history on file.   No family history on file.   Social History     Socioeconomic History    Marital status: Single   Tobacco Use    Smoking status: Never    Smokeless tobacco: Never   Vaping Use    Vaping status: Never Used   Substance and Sexual Activity    Alcohol use: No    Drug use: Yes     Types: Cannabis     Comment: occ     Social Drivers of Health     Food Insecurity: Low Risk  (3/25/2025)    Received from [facility]    Food Insecurity     Have there been times that your food ran out, and you didn't have money to get more?: No     Are there times that you worry that this might happen?: No   Transportation Needs: Low Risk  (3/25/2025)    Received from [facility]    Transportation Needs     Do you have trouble getting transportation to medical appointments?: No   Housing Stability: Low Risk  (3/25/2025)    Received from [facility]    Housing Stability     Are you worried that your electric, gas, oil, or water might be shut off?: No     Are you concerned about having a safe and reliable place to live?: No          PHYSICAL  EXAMINATION:  /67 (BP Location: Right arm, Patient Position: Sitting, Cuff Size: adult)   Pulse 80   Ht 4' 11\" (1.499 m)   Wt 107 lb (48.5 kg)   LMP  (LMP Unknown)   BMI 21.61 kg/m²   Physical Exam  Constitutional:       Appearance: Normal appearance.   Abdominal:      Palpations: Abdomen is soft.      Tenderness: There is no abdominal tenderness.   Neurological:      Mental Status: She is alert.   Psychiatric:         Mood and Affect: Mood normal.         Behavior: Behavior normal.           OBSTETRIC ULTRASOUND  The patient had a level II ultrasound today which revealed size consistent with dates and a normal detailed anatomic survey.  Ultrasound Findings:  Single IUP in cephalic presenta??on.  Placenta is posterior.  A 3 vessel cord is noted.  Cardiac ac??vity is present at 151 bpm   g ( 0 lb 11 oz);  MVP is 4.2 cm .  The fetal measurements are consistent with the established EDC. No ultrasound evidence of structural  abnormali??es are seen today. The nasal bone is present. No ultrasound evidence of markers for aneuploidy are  seen. She understands that ultrasound exam cannot exclude gene??c abnormali??es and that gene??c johnna??ng is  recommended. The limita??ons of ultrasound were discussed.  Uterus and adnexa appeared normal today on US  See PACS/Imaging Tab For Complete Ultrasound Report  I interpreted the results and reviewed them with the patient.    DISCUSSION  During her visit we discussed and reviewed the following issues:  INFLAMMATORY BOWEL DISEASE IN PREGNANCY     INTRODUCTION  Women with inflammatory bowel disease (IBD) are at an increased risk for worse obstetric and medical outcomes as compared with the age-matched general population.     EFFECT OF PREGNANCY ON IBD  For patients with quiescent IBD at conception, the course of IBD is approximately the same as in nonpregnant patients. Patients with Crohn disease have a similar disease course during pregnancy and the postpartum as  nonpregnant women with IBD. However, for patients with ulcerative colitis, there is greater disease activity during pregnancy and the postpartum than the nonpregnant patient. The reason for this is unclear and may be associated with smoking cessation in the Crohn patient and cytokines secreted by the placenta.      Approximately one-third of women with quiescent disease at conception relapse during the pregnancy. Relapses are more common during the first trimester. However, remission achieved during pregnancy is likely to be sustained throughout the remainder of the pregnancy.     In contrast, approximately 70 percent of patients with active disease at conception are likely to have continued or worsening symptoms during pregnancy.     EFFECT OF IBD ON PREGNANCY   Pregnant women with IBD may be at increased risk for antepartum hemorrhage, low birth weight infants, and premature delivery. However, the risk of congenital abnormalities does not appear to be increased.     The degree of disease activity may account, in part, for the poor pregnancy-related outcomes.  The risk of poor pregnancy outcomes in women with IBD is greatest in those who have active disease at the time of conception, and in whom remission may be difficult to achieve during pregnancy.     MEDICATIONS DURING PREGNANCY AND LACTATION  Introduction  The choice of antiinflammatory and immunosuppressive used during pregnancy and lactation should be based upon their relative safety and indications as well as the risk of relapse of inflammatory bowel disease (IBD) if the medications are discontinued.      Active IBD itself is associated with poor pregnancy outcomes, and therefore the risks associated with discontinuing some medications (eg, azathioprine, anti-tumor necrosis factor [anti-TNF] agents) are higher than the known risks of the medications themselves.     NUTRITION  Consultation with a nutritionist should be considered to offer advice on eating a  well-balanced, healthy diet.  Folate supplementation (2 mg daily) should be recommended in patients with IBD on low residue diets, with ileal involvement, and patients on medications that interfere with folic acid metabolism (eg, sulfasalazine).  Iron and B12 levels should therefore be checked in the first trimester and supplementation should be provided as needed     MODE OF DELIVERY  Patients with active perineal disease or rectal involvement with Crohn disease should undergo  delivery in order to avoid perineal trauma from vaginal birth that can trigger or worsen existing perineal disease. The mode of delivery in all other patients with inflammatory bowel disease should be dictated by obstetric necessity. In patients with an ileoanal anastomosis or J pouch, many advocate planned  delivery as well, though data suggest no long-term worsening of pouch function.\Vaginal delivery can be attempted in women with a colostomy, ileostomy, or continent ileostomy and has not been associated with an increased risk of ostomy complications.    Mesalamine - Adverse events have not been observed in animal reproduction studies. Mesalamine is known to cross the placenta. An increased rate of congenital malformations has not been observed in human studies.  birth, still birth and decreased birth weight have been observed; however, these events may also be due to maternal disease.    When treatment for inflammatory bowel disease is needed during pregnancy, mesalamine may be used, although products with DBP should be avoided (Tamiko 2012; Dennis 2009).      Prednisone and prednisolone cross the human placenta. Some studies have shown an association between first trimester prednisone use and oral clefts; adverse events in the fetus/ have been noted in case reports following large doses of systemic corticosteroids during pregnancy.  It is compatible with breast feeding.       Imaging:      CT ABDOMEN PELVIS  W CONTRAST 12/2/2024  IMPRESSION:   1.  Circumferential colonic wall thickening beginning in the distal transverse colon and extending through the mid to distal sigmoid colon. Subcentimeter pericolonic lymph nodes and prominence of the vasa recta. Findings most consistent with nonspecific colitis. Follow-up endoscopy recommended.  2.   Thick walled peripherally enhancing and collapsing left ovarian cyst. Mild free fluid in the dependent pelvis.  3.  See other findings above.       01/21/25   PATIENT NAME: Leah Craig   MRN:[id number]     DATE OF ADMISSION: 1/15/2025  DATE OF DISCHARGE: 1/21/25  DISCHARGE LOCATION: home   CONDITION AT DISCHARGE: good   DISCHARGING PHYSICIAN: Maninder Calzada DO     TEST RESULTS PENDING UPON DISCHARGE: None     REASON FOR HOSPITALIZATION:   Colitis [K52.9]  Anemia [D64.9]  GI bleed [K92.2]  Abdominal pain [R10.9]  First trimester pregnancy [Z34.91]     DISCHARGE DIAGNOSIS:  Hematochezia     HOSPITAL COURSE:   Newly diagnosed ulcerative colitis  Acute on chronic blood loss & Fe def anemia  - GI consulted, s/p flex sig w/biopsies 1/16/25 (path confirmed UC)  - Pred 40x7d, 30x7d, 20x7d, 10x7d, 5x7d  - Cont PO mesalamine indefinitely, rectal mesalamine QHS for 1 month  - PO pepcid BID for GI ppx  - PO iron   - PRN tylenol/norco  - F/u with Dr. Fink to arrange future remicade infusion (s/p 1st dose 1/18/25)    Infliximab (Remicade) is an immunosuppressant agent (monoclonal antibody and TNF blocking agent) used to treat a number of conditions including ankylosing spondylitis, crohn's disease, ulcerative colitis, rheumatoid arthritis, psoriasis and psoriatic arthritis.  It is considered a category B medication in human pregnancy.      There have not been animal studies performed.  Infliximab crosses the placenta and can be detected in serum of infant for up to 6 months following in utero exposure.  Therefore, it is recommended to wait >6 months before administering any live vaccine to  infants exposed to infliximab in utero.  When biologic agents such as infliximab are needed during pregnancy, it is recommended to hold therapy after 30 weeks gestation.    Healthcare providers are encouraged to enroll women on infliximab during pregnanc in the [facility] Autoimmune Disease Study by contacting the [employer] ([employer]) [phone].      IMPRESSION:  IUP at 20w0d  Newly diagnosed Ulcerative colitis    RECOMMENDATIONS:  Continue care with Dr. Rollins  Monthly growth US in third trimester with BPP at or beyond 32 weeks.  Continue medications for ulcerative colitis and co-management with GI       Thank you for allowing me to participate in the care of your patient.  Please do not hesitate to contact me if additional questions or concerns arise.      Pauline Paredes M.D.    40 minutes spent in review of records, patient consultation, documentation and coordination of care.  The relevant clinical matter(s) are summarized above.     Note to patient and family  The 21st Century Cures Act makes medical notes available to patients in the interest of transparency.  However, please be advised that this is a medical document.  It is intended as [id number]-cfnt communication.  It is written and medical language may contain abbreviations or verbiage that are technical and unfamiliar.  It may appear blunt or direct.  Medical documents are intended to carry relevant information, facts as evident, and the clinical opinion of the practitioner.         [1] No Known Allergies      Pt here for level II ultrasound  Pt reports possible flutters   Pt denies any concerns today

## (undated) NOTE — LETTER
Date: 9/27/2022    Patient Name: Nancy Hanley          To Whom it may concern: This letter has been written at the patient's request. The above patient was seen at the [facility] for treatment of a medical condition. This patient should be excused from attending work/school on 9/27/22. The patient may return to work/school on 9/28/22.        Sincerely,    Lior Pike, DO

## (undated) NOTE — LETTER
Date: 1/29/2018    Patient Name: Ilda Galaviz          To Whom it may concern: This letter has been written at the patient's request. The above patient was seen at the [place] [facility] for treatment of a medical condition.     This patient kassi

## (undated) NOTE — LETTER
Date: 5/18/2018    Patient Name: Cele Davenport          To Whom it may concern: This letter has been written at the patient's request. The above patient was seen at the [facility] for treatment of a medical condition.     This patient kassi

## (undated) NOTE — LETTER
Date: 3/8/2018    Patient Name: Mannie Sarmiento          To Whom it may concern: This letter has been written at the patient's request. The above patient was seen at the [facility] for treatment of a medical condition.     This patient shou

## (undated) NOTE — LETTER
Date: 12/21/2023    Patient Name: Polly Mitchell          To Whom it may concern: This letter has been written at the patient's request. The above patient was seen at the [facility] for treatment of a medical condition. This patient should be excused from attending school from 12/19/23 through 12/20/23.          Sincerely,    Arun Herron DO

## (undated) NOTE — LETTER
Date: 12/13/2018    Patient Name: Cat Victor          To Whom it may concern: This letter has been written at the patient's request. The above patient was seen at the [facility] for treatment of a medical condition.     This patient sh

## (undated) NOTE — LETTER
Date: 1/9/2018    Patient Name: Le Bang          To Whom it may concern: The above patient was seen at the [facility] for treatment of a medical condition. This patient should be excused from attending work/school on 1/9/18.

## (undated) NOTE — LETTER
May 28, 2025      Nisha Rollins, DO  [address] [facility] [address]  Via In Basket  Patient: Leah Craig  : 2005    Dear Colleague:  Thank you for referring your patient to me for a Maternal Fetal Medicine evaluation. Please see my attached note for my findings and recommendations.      Should you have any questions or concerns, please do not hesitate to contact me at the number listed below.    Best Regards,      Pauline Paredes MD  [facility]  [address] [facility] [address]  [place] [address]  [phone]    cc: No Recipients      [facility] Department of Maternal Fetal Medicine  Patient Name: Leah Craig  Patient : 2005  Physician: Pauline Paredes MD    Pt here for growth ultrasound    Outpatient Maternal-Fetal Medicine Consultation    Dear Dr. Rollins    Thank you for requesting ultrasound evaluation and maternal fetal medicine consultation on your patient Leah Craig.  As you are aware she is a 19 year old female  with a bello pregnancy and an Estimated Date of Delivery: 25.  A maternal-fetal medicine f/u is today. Her prenatal records and labs were reviewed.      ROS    HISTORY  OB History    Para Term  AB Living   1        SAB IAB Ectopic Multiple Live Births             # Outcome Date GA Lbr Marty/2nd Weight Sex Type Anes PTL Lv   1 Current                Allergies:  Allergies[1]   Current Meds:  Current Outpatient Medications   Medication Sig Dispense Refill    Biotin 1 MG Oral Cap Take by mouth.      Mesalamine 4 g Rectal Enema       Prenatal 27-0.8 MG Oral Tab Take 1 tablet by mouth in the morning.      predniSONE 10 MG Oral Tab Take 1 tablet (10 mg total) by mouth in the morning.      Mesalamine  MG Oral Cap CR Take 4 capsules (1,000 mg total) by mouth daily.      famotidine 20 MG Oral Tab Take 1 tablet (20 mg total) by mouth in the morning and 1 tablet (20 mg total) before bedtime.       Ferrous Sulfate 325 (65 Fe) MG Oral Tab Take 1 tablet (325 mg total) by mouth in the morning.      mesalamine 1.2 g Oral Tab EC Take 4 tablets (4.8 g total) by mouth daily with breakfast.      fluticasone propionate 50 MCG/ACT Nasal Suspension 1 spray by Nasal route daily. (Patient not taking: Reported on 5/16/2025)      albuterol 108 (90 Base) MCG/ACT Inhalation Aero Soln Inhale 2 puffs into the lungs. (Patient not taking: Reported on 5/16/2025)      prenatal vitamin with DHA 27-0.8-228 MG Oral Cap Take 1 capsule by mouth daily. 90 capsule 3        HISTORY:  Past Medical History:    Ulcerative colitis (HCC)      No past surgical history on file.   No family history on file.   Social History     Socioeconomic History    Marital status: Single   Tobacco Use    Smoking status: Never    Smokeless tobacco: Never   Vaping Use    Vaping status: Never Used   Substance and Sexual Activity    Alcohol use: No    Drug use: Yes     Types: Cannabis     Comment: occ     Social Drivers of Health     Food Insecurity: Low Risk  (3/25/2025)    Received from [facility]    Food Insecurity     Have there been times that your food ran out, and you didn't have money to get more?: No     Are there times that you worry that this might happen?: No   Transportation Needs: Low Risk  (3/25/2025)    Received from [facility]    Transportation Needs     Do you have trouble getting transportation to medical appointments?: No   Housing Stability: Low Risk  (3/25/2025)    Received from [facility]    Housing Stability     Are you worried that your electric, gas, oil, or water might be shut off?: No     Are you concerned about having a safe and reliable place to live?: No          PHYSICAL EXAMINATION:  /71 (BP Location: Right arm, Patient Position: Sitting, Cuff Size: adult)   Pulse 94   Ht 4' 11\" (1.499 m)   Wt 123 lb (55.8 kg)   LMP  (LMP Unknown)   BMI 24.84 kg/m²    Physical Exam  Constitutional:       Appearance: Normal appearance.   Abdominal:      Palpations: Abdomen is soft.      Tenderness: There is no abdominal tenderness.   Neurological:      Mental Status: She is alert.   Psychiatric:         Mood and Affect: Mood normal.         Behavior: Behavior normal.           OBSTETRIC ULTRASOUND  The patient had a follow-up growth ultrasound today which revealed normal interval fetal growth.  Ultrasound Findings:    Single IUP in breech presentation.    Placenta is posterior.   Cardiac activity is present at 159 bpm  EFW 1094 g ( 2 lb 7 oz); 44%.    MVP is 4.5 cm . BARRETT 13.6 cm    The fetal measurements are consistent with established MANDIE. No gross ultrasound evidence of structural abnormalities are seen today. The patient understands that ultrasound cannot rule out all structural and chromosomal abnormalities.   See PACS/Imaging Tab For Complete Ultrasound Report  I interpreted the results and reviewed them with the patient.    DISCUSSION  During her visit we discussed and reviewed the following issues:  INFLAMMATORY BOWEL DISEASE IN PREGNANCY     She was diagnosed in late November/December of 2024 with ulcerative colitis.  She was recently discharged from an admission for her ulcerative colitis.  She received her first dose of remicade and is on mesalamine along with  prednisone.  While she feels some better than she did in the hospital, she continues to have frequent bowel movements.  This is a desired pregnancy.    INTRODUCTION  Women with inflammatory bowel disease (IBD) are at an increased risk for worse obstetric and medical outcomes as compared with the age-matched general population.     EFFECT OF PREGNANCY ON IBD  For patients with quiescent IBD at conception, the course of IBD is approximately the same as in nonpregnant patients. Patients with Crohn disease have a similar disease course during pregnancy and the postpartum as nonpregnant women with IBD. However, for  patients with ulcerative colitis, there is greater disease activity during pregnancy and the postpartum than the nonpregnant patient. The reason for this is unclear and may be associated with smoking cessation in the Crohn patient and cytokines secreted by the placenta.      Approximately one-third of women with quiescent disease at conception relapse during the pregnancy. Relapses are more common during the first trimester. However, remission achieved during pregnancy is likely to be sustained throughout the remainder of the pregnancy.     In contrast, approximately 70 percent of patients with active disease at conception are likely to have continued or worsening symptoms during pregnancy.     EFFECT OF IBD ON PREGNANCY   Pregnant women with IBD may be at increased risk for antepartum hemorrhage, low birth weight infants, and premature delivery. However, the risk of congenital abnormalities does not appear to be increased.     The degree of disease activity may account, in part, for the poor pregnancy-related outcomes.  The risk of poor pregnancy outcomes in women with IBD is greatest in those who have active disease at the time of conception, and in whom remission may be difficult to achieve during pregnancy.     MEDICATIONS DURING PREGNANCY AND LACTATION  Introduction  The choice of antiinflammatory and immunosuppressive used during pregnancy and lactation should be based upon their relative safety and indications as well as the risk of relapse of inflammatory bowel disease (IBD) if the medications are discontinued.      Active IBD itself is associated with poor pregnancy outcomes, and therefore the risks associated with discontinuing some medications (eg, azathioprine, anti-tumor necrosis factor [anti-TNF] agents) are higher than the known risks of the medications themselves.     NUTRITION  Consultation with a nutritionist should be considered to offer advice on eating a well-balanced, healthy diet.  Folate  supplementation (2 mg daily) should be recommended in patients with IBD on low residue diets, with ileal involvement, and patients on medications that interfere with folic acid metabolism (eg, sulfasalazine).  Iron and B12 levels should therefore be checked in the first trimester and supplementation should be provided as needed     MODE OF DELIVERY  Patients with active perineal disease or rectal involvement with Crohn disease should undergo  delivery in order to avoid perineal trauma from vaginal birth that can trigger or worsen existing perineal disease. The mode of delivery in all other patients with inflammatory bowel disease should be dictated by obstetric necessity. In patients with an ileoanal anastomosis or J pouch, many advocate planned  delivery as well, though data suggest no long-term worsening of pouch function.\Vaginal delivery can be attempted in women with a colostomy, ileostomy, or continent ileostomy and has not been associated with an increased risk of ostomy complications.    Mesalamine - Adverse events have not been observed in animal reproduction studies. Mesalamine is known to cross the placenta. An increased rate of congenital malformations has not been observed in human studies.  birth, still birth and decreased birth weight have been observed; however, these events may also be due to maternal disease.    When treatment for inflammatory bowel disease is needed during pregnancy, mesalamine may be used, although products with DBP should be avoided (Tamiko 2012; Dennis 2009).      Prednisone and prednisolone cross the human placenta. Some studies have shown an association between first trimester prednisone use and oral clefts; adverse events in the fetus/ have been noted in case reports following large doses of systemic corticosteroids during pregnancy.  It is compatible with breast feeding.       Imaging:      CT ABDOMEN PELVIS W CONTRAST 2024  IMPRESSION:    1.  Circumferential colonic wall thickening beginning in the distal transverse colon and extending through the mid to distal sigmoid colon. Subcentimeter pericolonic lymph nodes and prominence of the vasa recta. Findings most consistent with nonspecific colitis. Follow-up endoscopy recommended.  2.   Thick walled peripherally enhancing and collapsing left ovarian cyst. Mild free fluid in the dependent pelvis.  3.  See other findings above.       01/21/25   PATIENT NAME: Leah Craig   MRN:[id number]     DATE OF ADMISSION: 1/15/2025  DATE OF DISCHARGE: 1/21/25  DISCHARGE LOCATION: home   CONDITION AT DISCHARGE: good   DISCHARGING PHYSICIAN: Maninder Calzada DO     TEST RESULTS PENDING UPON DISCHARGE: None     REASON FOR HOSPITALIZATION:   Colitis [K52.9]  Anemia [D64.9]  GI bleed [K92.2]  Abdominal pain [R10.9]  First trimester pregnancy [Z34.91]     DISCHARGE DIAGNOSIS:  Hematochezia     HOSPITAL COURSE:   Newly diagnosed ulcerative colitis  Acute on chronic blood loss & Fe def anemia  - GI consulted, s/p flex sig w/biopsies 1/16/25 (path confirmed UC)  - Pred 40x7d, 30x7d, 20x7d, 10x7d, 5x7d  - Cont PO mesalamine indefinitely, rectal mesalamine QHS for 1 month  - PO pepcid BID for GI ppx  - PO iron   - PRN tylenol/norco  - F/u with Dr. Fink to arrange future remicade infusion (s/p 1st dose 1/18/25)    Infliximab (Remicade) is an immunosuppressant agent (monoclonal antibody and TNF blocking agent) used to treat a number of conditions including ankylosing spondylitis, crohn's disease, ulcerative colitis, rheumatoid arthritis, psoriasis and psoriatic arthritis.  It is considered a category B medication in human pregnancy.      There have not been animal studies performed.  Infliximab crosses the placenta and can be detected in serum of infant for up to 6 months following in utero exposure.  Therefore, it is recommended to wait >6 months before administering any live vaccine to infants exposed to infliximab in  utero.  When biologic agents such as infliximab are needed during pregnancy, it is recommended to hold therapy after 30 weeks gestation.    Healthcare providers are encouraged to enroll women on infliximab during pregnanc in the MotherToBaby Autoimmune Disease Study by contacting the [employer] ([employer]) [phone].      IMPRESSION:  IUP at 28w0d   Newly diagnosed Ulcerative colitis    RECOMMENDATIONS:  Continue care with Dr. Rollins  Monthly growth US in third trimester with BPP at or beyond 32 weeks.  Continue medications for ulcerative colitis and co-management with GI       Thank you for allowing me to participate in the care of your patient.  Please do not hesitate to contact me if additional questions or concerns arise.      Pauline Paredes M.D.    40 minutes spent in review of records, patient consultation, documentation and coordination of care.  The relevant clinical matter(s) are summarized above.     Note to patient and family  The 21st Century Cures Act makes medical notes available to patients in the interest of transparency.  However, please be advised that this is a medical document.  It is intended as rpum-fm-edil communication.  It is written and medical language may contain abbreviations or verbiage that are technical and unfamiliar.  It may appear blunt or direct.  Medical documents are intended to carry relevant information, facts as evident, and the clinical opinion of the practitioner.                         [1]  No Known Allergies

## (undated) NOTE — LETTER
Date: 10/5/2017    Patient Name: Susana Zhu          To Whom it may concern: The above patient was seen at the [facility] for a physical and immunizations.      This patient should be excused from attending work/school on the morning